# Patient Record
Sex: FEMALE | Race: BLACK OR AFRICAN AMERICAN | Employment: FULL TIME | URBAN - METROPOLITAN AREA
[De-identification: names, ages, dates, MRNs, and addresses within clinical notes are randomized per-mention and may not be internally consistent; named-entity substitution may affect disease eponyms.]

---

## 2017-02-02 ENCOUNTER — OFFICE VISIT (OUTPATIENT)
Dept: INTERNAL MEDICINE CLINIC | Age: 52
End: 2017-02-02

## 2017-02-02 VITALS
WEIGHT: 148.1 LBS | DIASTOLIC BLOOD PRESSURE: 84 MMHG | BODY MASS INDEX: 23.8 KG/M2 | TEMPERATURE: 99.2 F | SYSTOLIC BLOOD PRESSURE: 132 MMHG | HEART RATE: 98 BPM | HEIGHT: 66 IN | OXYGEN SATURATION: 99 % | RESPIRATION RATE: 20 BRPM

## 2017-02-02 DIAGNOSIS — G47.26 SHIFT WORK SLEEP DISORDER: ICD-10-CM

## 2017-02-02 DIAGNOSIS — G47.9 TROUBLE IN SLEEPING: Primary | ICD-10-CM

## 2017-02-02 DIAGNOSIS — E55.9 VITAMIN D DEFICIENCY: ICD-10-CM

## 2017-02-02 RX ORDER — ZOLPIDEM TARTRATE 5 MG/1
5 TABLET ORAL
Qty: 30 TAB | Refills: 2 | Status: SHIPPED | OUTPATIENT
Start: 2017-02-02

## 2017-02-02 RX ORDER — ERGOCALCIFEROL 1.25 MG/1
50000 CAPSULE ORAL
Qty: 14 CAP | Refills: 0 | Status: SHIPPED | OUTPATIENT
Start: 2017-02-02 | End: 2017-05-01 | Stop reason: SDUPTHER

## 2017-02-02 RX ORDER — ERGOCALCIFEROL 1.25 MG/1
50000 CAPSULE ORAL
COMMUNITY
End: 2017-02-02 | Stop reason: SDUPTHER

## 2017-02-02 NOTE — PATIENT INSTRUCTIONS
Short-acting Sleep agents:   Zolpidem (Ambien) 5 to 10 mg  Used for initiating sleep and sleep maintenance  Onset: 7 to 27 minutes  Half-life: 2 to 3 hours  Learning About Sleeping Well  What does sleeping well mean? Sleeping well means getting enough sleep. How much sleep is enough varies among people. The number of hours you sleep is not as important as how you feel when you wake up. If you do not feel refreshed, you probably need more sleep. Another sign of not getting enough sleep is feeling tired during the day. The average total nightly sleep time is 7½ to 8 hours. Healthy adults may need a little more or a little less than this. Why is getting enough sleep important? Getting enough quality sleep is a basic part of good health. When your sleep suffers, your mood and your thoughts can suffer too. You may find yourself feeling more grumpy or stressed. Not getting enough sleep also can lead to serious problems, including injury, accidents, anxiety, and depression. What might cause poor sleeping? Many things can cause sleep problems, including:  · Stress. Stress can be caused by fear about a single event, such as giving a speech. Or you may have ongoing stress, such as worry about work or school. · Depression, anxiety, and other mental or emotional conditions. · Changes in your sleep habits or surroundings. This includes changes that happen where you sleep, such as noise, light, or sleeping in a different bed. It also includes changes in your sleep pattern, such as having jet lag or working a late shift. · Health problems, such as pain, breathing problems, and restless legs syndrome. · Lack of regular exercise. How can you help yourself? Here are some tips that may help you sleep more soundly and wake up feeling more refreshed. Your sleeping area  · Use your bedroom only for sleeping and sex. A bit of light reading may help you fall asleep.  But if it doesn't, do your reading elsewhere in the house. Don't watch TV in bed. · Be sure your bed is big enough to stretch out comfortably, especially if you have a sleep partner. · Keep your bedroom quiet, dark, and cool. Use curtains, blinds, or a sleep mask to block out light. To block out noise, use earplugs, soothing music, or a \"white noise\" machine. Your evening and bedtime routine  · Create a relaxing bedtime routine. You might want to take a warm shower or bath, listen to soothing music, or drink a cup of noncaffeinated tea. · Go to bed at the same time every night. And get up at the same time every morning, even if you feel tired. What to avoid  · Limit caffeine (coffee, tea, caffeinated sodas) during the day, and don't have any for at least 4 to 6 hours before bedtime. · Don't drink alcohol before bedtime. Alcohol can cause you to wake up more often during the night. · Don't smoke or use tobacco, especially in the evening. Nicotine can keep you awake. · Don't take naps during the day, especially close to bedtime. · Don't lie in bed awake for too long. If you can't fall asleep, or if you wake up in the middle of the night and can't get back to sleep within 15 minutes or so, get out of bed and go to another room until you feel sleepy. · Don't take medicine right before bed that may keep you awake or make you feel hyper or energized. Your doctor can tell you if your medicine may do this and if you can take it earlier in the day. If you can't sleep  · Imagine yourself in a peaceful, pleasant scene. Focus on the details and feelings of being in a place that is relaxing. · Get up and do a quiet or boring activity until you feel sleepy. · Don't drink any liquids after 6 p.m. if you wake up often because you have to go to the bathroom. Where can you learn more? Go to http://zuri-juan pablo.info/. Enter M019 in the search box to learn more about \"Learning About Sleeping Well. \"  Current as of: July 26, 2016  Content Version: 11.1  © 7821-3191 Healthwise, Inc[employer]. Care instructions adapted under license by [employer] (which disclaims liability or warranty for this information). If you have questions about a medical condition or this instruction, always ask your healthcare professional. [address] any warranty or liability for your use of this information.

## 2017-02-02 NOTE — PROGRESS NOTES
Ms. Keven Comer is a 51y.o. year old female who had concerns including Insomnia. HPI:  Chief Complaint   Patient presents with    Insomnia     pt requesing rx for sleeping aid, on night shift, and shifts to day shift .     11;30 at nigt 7:30 and 7:30am-3 pm , alternating. Melatonin initially worked, then felt bad. Past Medical History   Diagnosis Date    Hx of colonoscopy with polypectomy Fall 2016     follow up in 3-5 years     Current Outpatient Prescriptions   Medication Sig Dispense    zolpidem (AMBIEN) 5 mg tablet Take 1 Tab by mouth nightly as needed for Sleep. Max Daily Amount: 5 mg. Indications: SLEEP-ONSET INSOMNIA 30 Tab    ergocalciferol (VITAMIN D2) 50,000 unit capsule Take 1 Cap by mouth every seven (7) days. 14 Cap    emtricitabine-tenofovir (TRUVADA) 200-300 mg per tablet Take  by mouth daily.  atazanavir (REYATAZ) 300 mg capsule Take 300 mg by mouth Daily (before breakfast).  ritonavir (NORVIR) 100 mg tablet Take  by mouth two (2) times daily (with meals). No current facility-administered medications for this visit. Reviewed PmHx, RxHx, FmHx, SocHx, AllgHx and updated and dated in the chart. ROS: Negative except for BOLD  General: fever, chills, fatigue  Respiratory: cough, SOB, wheezing  Cardiovascular:  CP, palpitation, CARVER, edema   Gastrointestinal: N/V/D, bleeding  Genito-Urinary: dysuria, hematuria  Musculoskeletal: muscle weakness, pain, swelling    OBJECTIVE:   Visit Vitals    /84 (BP 1 Location: Left arm, BP Patient Position: Sitting)    Pulse 98    Temp 99.2 °F (37.3 °C) (Oral)    Resp 20    Ht 5' 6\" (1.676 m)    Wt 148 lb 1.6 oz (67.2 kg)    SpO2 99%    BMI 23.9 kg/m2     GEN: The patient appears well, alert, oriented x 3, in no distress. ENT: bilateral TM and canal normal.  Neck supple. No adenopathy or thyromegaly. CUONG. Lungs: clear bilaterally, good air entry, no wheezes, rhonchi or rales.    Cardiovascular: regular rate and rhythm. S1 and S2 normal, no murmurs,  Abdomen: + BS, soft without tenderness, guarding, rebound, mass or organomegaly. Extremities: no edema, normal peripheral pulses. Neurological: normal, gross sensory and motor in tact without focal findings. Assessment/ Plan:       ICD-10-CM ICD-9-CM    1. Trouble in sleeping G47.9 780.50 zolpidem (AMBIEN) 5 mg tablet      SLEEP MEDICINE REFERRAL      ergocalciferol (VITAMIN D2) 50,000 unit capsule      DISCONTINUED: ergocalciferol (VITAMIN D2) 50,000 unit capsule      CANCELED: REFERRAL TO PSYCHOLOGY   2. Vitamin D deficiency E55.9 268.9 ergocalciferol (VITAMIN D2) 50,000 unit capsule     Sleep referral for sleep therapist.   No more than 3 months med treatment only      I have discussed the diagnosis with the patient and the intended plan as seen in the above orders. The patient has received an after-visit summary and questions were answered concerning future plans. Medication Side Effects and Warnings were discussed with patient.     Follow-up Disposition: Not on R Steve Vasquez MD

## 2017-10-30 ENCOUNTER — HOSPITAL ENCOUNTER (EMERGENCY)
Age: 52
Discharge: HOME | End: 2017-10-30
Attending: EMERGENCY MEDICINE
Payer: COMMERCIAL

## 2017-10-30 ENCOUNTER — APPOINTMENT (OUTPATIENT)
Dept: GENERAL RADIOLOGY | Age: 52
End: 2017-10-30
Attending: EMERGENCY MEDICINE
Payer: COMMERCIAL

## 2017-10-30 VITALS
HEART RATE: 80 BPM | TEMPERATURE: 98.4 F | DIASTOLIC BLOOD PRESSURE: 81 MMHG | BODY MASS INDEX: 23.95 KG/M2 | RESPIRATION RATE: 18 BRPM | SYSTOLIC BLOOD PRESSURE: 137 MMHG | OXYGEN SATURATION: 100 % | WEIGHT: 149.03 LBS | HEIGHT: 66 IN

## 2017-10-30 DIAGNOSIS — J06.9 ACUTE URI: Primary | ICD-10-CM

## 2017-10-30 LAB
ANION GAP SERPL CALC-SCNC: 4 MMOL/L (ref 5–15)
BASOPHILS # BLD: 0 K/UL (ref 0–0.1)
BASOPHILS NFR BLD: 0 % (ref 0–1)
BUN SERPL-MCNC: 16 MG/DL (ref 6–20)
BUN/CREAT SERPL: 26 (ref 12–20)
CALCIUM SERPL-MCNC: 8.5 MG/DL (ref 8.5–10.1)
CHLORIDE SERPL-SCNC: 108 MMOL/L (ref 97–108)
CO2 SERPL-SCNC: 28 MMOL/L (ref 21–32)
CREAT SERPL-MCNC: 0.61 MG/DL (ref 0.55–1.02)
EOSINOPHIL # BLD: 0.2 K/UL (ref 0–0.4)
EOSINOPHIL NFR BLD: 4 % (ref 0–7)
ERYTHROCYTE [DISTWIDTH] IN BLOOD BY AUTOMATED COUNT: 14.8 % (ref 11.5–14.5)
GLUCOSE SERPL-MCNC: 89 MG/DL (ref 65–100)
HCT VFR BLD AUTO: 42 % (ref 35–47)
HGB BLD-MCNC: 14.2 G/DL (ref 11.5–16)
LYMPHOCYTES # BLD: 1.6 K/UL (ref 0.8–3.5)
LYMPHOCYTES NFR BLD: 28 % (ref 12–49)
MCH RBC QN AUTO: 29.1 PG (ref 26–34)
MCHC RBC AUTO-ENTMCNC: 33.8 G/DL (ref 30–36.5)
MCV RBC AUTO: 86.1 FL (ref 80–99)
MONOCYTES # BLD: 0.6 K/UL (ref 0–1)
MONOCYTES NFR BLD: 10 % (ref 5–13)
NEUTS SEG # BLD: 3.3 K/UL (ref 1.8–8)
NEUTS SEG NFR BLD: 58 % (ref 32–75)
PLATELET # BLD AUTO: 177 K/UL (ref 150–400)
POTASSIUM SERPL-SCNC: 3.8 MMOL/L (ref 3.5–5.1)
RBC # BLD AUTO: 4.88 M/UL (ref 3.8–5.2)
SODIUM SERPL-SCNC: 140 MMOL/L (ref 136–145)
WBC # BLD AUTO: 5.6 K/UL (ref 3.6–11)

## 2017-10-30 PROCEDURE — 96374 THER/PROPH/DIAG INJ IV PUSH: CPT

## 2017-10-30 PROCEDURE — 85025 COMPLETE CBC W/AUTO DIFF WBC: CPT | Performed by: EMERGENCY MEDICINE

## 2017-10-30 PROCEDURE — 99282 EMERGENCY DEPT VISIT SF MDM: CPT

## 2017-10-30 PROCEDURE — 96361 HYDRATE IV INFUSION ADD-ON: CPT

## 2017-10-30 PROCEDURE — 74011250636 HC RX REV CODE- 250/636: Performed by: EMERGENCY MEDICINE

## 2017-10-30 PROCEDURE — 36415 COLL VENOUS BLD VENIPUNCTURE: CPT | Performed by: EMERGENCY MEDICINE

## 2017-10-30 PROCEDURE — 80048 BASIC METABOLIC PNL TOTAL CA: CPT | Performed by: EMERGENCY MEDICINE

## 2017-10-30 PROCEDURE — 71020 XR CHEST PA LAT: CPT

## 2017-10-30 RX ORDER — AZITHROMYCIN 250 MG/1
TABLET, FILM COATED ORAL
Qty: 6 TAB | Refills: 0 | Status: SHIPPED | OUTPATIENT
Start: 2017-10-30 | End: 2018-08-05

## 2017-10-30 RX ORDER — SODIUM CHLORIDE 0.9 % (FLUSH) 0.9 %
5-10 SYRINGE (ML) INJECTION AS NEEDED
Status: DISCONTINUED | OUTPATIENT
Start: 2017-10-30 | End: 2017-10-30 | Stop reason: HOSPADM

## 2017-10-30 RX ORDER — HYDROCODONE BITARTRATE AND HOMATROPINE METHYLBROMIDE ORAL SOLUTION 5; 1.5 MG/5ML; MG/5ML
5 LIQUID ORAL
Qty: 120 ML | Refills: 0 | Status: SHIPPED | OUTPATIENT
Start: 2017-10-30 | End: 2018-08-05

## 2017-10-30 RX ORDER — KETOROLAC TROMETHAMINE 30 MG/ML
30 INJECTION, SOLUTION INTRAMUSCULAR; INTRAVENOUS
Status: COMPLETED | OUTPATIENT
Start: 2017-10-30 | End: 2017-10-30

## 2017-10-30 RX ORDER — SODIUM CHLORIDE 0.9 % (FLUSH) 0.9 %
5-10 SYRINGE (ML) INJECTION EVERY 8 HOURS
Status: DISCONTINUED | OUTPATIENT
Start: 2017-10-30 | End: 2017-10-30 | Stop reason: HOSPADM

## 2017-10-30 RX ORDER — PREDNISONE 20 MG/1
40 TABLET ORAL DAILY
Qty: 10 TAB | Refills: 0 | Status: SHIPPED | OUTPATIENT
Start: 2017-10-30 | End: 2017-11-04

## 2017-10-30 RX ORDER — AZITHROMYCIN 250 MG/1
TABLET, FILM COATED ORAL
Qty: 4 TAB | Refills: 0 | Status: SHIPPED | OUTPATIENT
Start: 2017-10-30 | End: 2017-10-30

## 2017-10-30 RX ADMIN — KETOROLAC TROMETHAMINE 30 MG: 30 INJECTION, SOLUTION INTRAMUSCULAR at 07:38

## 2017-10-30 RX ADMIN — SODIUM CHLORIDE 1000 ML: 900 INJECTION, SOLUTION INTRAVENOUS at 07:38

## 2017-10-30 NOTE — DISCHARGE INSTRUCTIONS
Saline Nasal Washes: Care Instructions  Your Care Instructions  Saline nasal washes help keep the nasal passages open by washing out thick or dried mucus. This simple remedy can help relieve symptoms of allergies, sinusitis, and colds. It also can make the nose feel more comfortable by keeping the mucous membranes moist. You may notice a little burning sensation in your nose the first few times you use the solution, but this usually gets better in a few days. Follow-up care is a key part of your treatment and safety. Be sure to make and go to all appointments, and call your doctor if you are having problems. It's also a good idea to know your test results and keep a list of the medicines you take. How can you care for yourself at home? · You can buy premixed saline solution in a squeeze bottle or other sinus rinse products at a drugstore. Read and follow the instructions on the label. · You also can make your own saline solution by adding 1 teaspoon of salt and 1 teaspoon of baking soda to 2 cups of distilled water. · If you use a homemade solution, pour a small amount into a clean bowl. Using a rubber bulb syringe, squeeze the syringe and place the tip in the salt water. Pull a small amount of the salt water into the syringe by relaxing your hand. · Sit down with your head tilted slightly back. Do not lie down. Put the tip of the bulb syringe or the squeeze bottle a little way into one of your nostrils. Gently drip or squirt a few drops into the nostril. Repeat with the other nostril. Some sneezing and gagging are normal at first.  · Gently blow your nose. · Wipe the syringe or bottle tip clean after each use. · Repeat this 2 or 3 times a day. · Use nasal washes gently if you have nosebleeds often. When should you call for help? Watch closely for changes in your health, and be sure to contact your doctor if:  ? · You often get nosebleeds. ? · You have problems doing the nasal washes.    Where can you learn more? Go to http://zuri-juan pablo.info/. Enter 071 [id number] [id number] in the search box to learn more about \"Saline Nasal Washes: Care Instructions. \"  Current as of: May 12, 2017  Content Version: 11.4  © 6769-8723 [employer]. Care instructions adapted under license by [employer] (which disclaims liability or warranty for this information). If you have questions about a medical condition or this instruction, always ask your healthcare professional. [address] any warranty or liability for your use of this information. Upper Respiratory Infection (Cold): Care Instructions  Your Care Instructions    An upper respiratory infection, or URI, is an infection of the nose, sinuses, or throat. URIs are spread by coughs, sneezes, and direct contact. The common cold is the most frequent kind of URI. The flu and sinus infections are other kinds of URIs. Almost all URIs are caused by viruses. Antibiotics won't cure them. But you can treat most infections with home care. This may include drinking lots of fluids and taking over-the-counter pain medicine. You will probably feel better in 4 to 10 days. The doctor has checked you carefully, but problems can develop later. If you notice any problems or new symptoms, get medical treatment right away. Follow-up care is a key part of your treatment and safety. Be sure to make and go to all appointments, and call your doctor if you are having problems. It's also a good idea to know your test results and keep a list of the medicines you take. How can you care for yourself at home? · To prevent dehydration, drink plenty of fluids, enough so that your urine is light yellow or clear like water. Choose water and other caffeine-free clear liquids until you feel better. If you have kidney, heart, or liver disease and have to limit fluids, talk with your doctor before you increase the amount of fluids you drink.   · Take an over-the-counter pain medicine, such as acetaminophen (Tylenol), ibuprofen (Advil, Motrin), or naproxen (Aleve). Read and follow all instructions on the label. · Before you use cough and cold medicines, check the label. These medicines may not be safe for young children or for people with certain health problems. · Be careful when taking over-the-counter cold or flu medicines and Tylenol at the same time. Many of these medicines have acetaminophen, which is Tylenol. Read the labels to make sure that you are not taking more than the recommended dose. Too much acetaminophen (Tylenol) can be harmful. · Get plenty of rest.  · Do not smoke or allow others to smoke around you. If you need help quitting, talk to your doctor about stop-smoking programs and medicines. These can increase your chances of quitting for good. When should you call for help? Call 911 anytime you think you may need emergency care. For example, call if:  ? · You have severe trouble breathing. ?Call your doctor now or seek immediate medical care if:  ? · You seem to be getting much sicker. ? · You have new or worse trouble breathing. ? · You have a new or higher fever. ? · You have a new rash. ? Watch closely for changes in your health, and be sure to contact your doctor if:  ? · You have a new symptom, such as a sore throat, an earache, or sinus pain. ? · You cough more deeply or more often, especially if you notice more mucus or a change in the color of your mucus. ? · You do not get better as expected. Where can you learn more? Go to http://zuri-juan pablo.info/. Enter M174 in the search box to learn more about \"Upper Respiratory Infection (Cold): Care Instructions. \"  Current as of: May 12, 2017  Content Version: 11.4  © 9921-7857 Healthwise, [employer]. Care instructions adapted under license by [employer] (which disclaims liability or warranty for this information).  If you have questions about a medical condition or this instruction, always ask your healthcare professional. [place] [address] any warranty or liability for your use of this information.

## 2017-10-30 NOTE — LETTER
[address] 
[facility] EMERGENCY DEPT 
[address] [address] [phone] Work/School Note Date: 10/30/2017 To Whom It May concern: 
 
Josephine Smith was seen and treated today in the emergency room by the following provider(s): 
Attending Provider: Francia Juarez DO. Josephine Smith off work on night of 10/30/2017.  
 
 
Sincerely, 
 
 
 
 
Francia Juarez DO

## 2017-10-30 NOTE — ED PROVIDER NOTES
[facility]\"" [facility].  EMERGENCY DEPARTMENT HISTORY AND PHYSICAL EXAM       Date of Service: 10/30/2017   Patient Name: Jannette Sierra   YOB: 1965  Medical Record Number: [id number]    History of Presenting Illness     Chief Complaint   Patient presents with    Flu     onset wedensday with flu like symptoms         History Provided By:  patient    Additional History:   Jannette Sierra is a 45 y.o. female who presents ambulatory to the ED with cc of 8/10 generalized body aches, sore throat, and mildly productive cough x ~5 days. Pt states her symptoms have been waxing and waning since onset. She also c/o mild diarrhea since onset. Pt states she has been taking Mucinex without improvement. She denies sick contacts. Pt denies hx of DM or kidney disease. She denies allergies to any medications. Pt specifically denies nausea, vomiting, fever, and chills. She notes she will be driving herself home. Social Hx: + Tobacco, + EtOH, - Illicit Drugs    There are no other complaints, changes or physical findings at this time. Primary Care Provider: Alexis Prince MD     Past History     Past Medical History:   Past Medical History:   Diagnosis Date    Hx of colonoscopy with polypectomy Fall 2016    follow up in 3-5 years        Past Surgical History:   Past Surgical History:   Procedure Laterality Date    HX HYSTERECTOMY  2007    partial, ovaries intact        Family History:   Family History   Problem Relation Age of Onset    Cancer Mother      stomach    Cancer Father         Social History:   Social History   Substance Use Topics    Smoking status: Current Every Day Smoker     Packs/day: 1.00    Smokeless tobacco: Never Used    Alcohol use Yes      Comment: occasionally        Allergies: Allergies   Allergen Reactions    Percocet [Oxycodone-Acetaminophen] Itching         Review of Systems   Review of Systems   Constitutional: Negative.   Negative for appetite change, chills, fatigue and fever. HENT: Positive for sore throat. Negative for congestion, rhinorrhea and sinus pressure. Eyes: Negative. Respiratory: Positive for cough. Negative for choking, chest tightness, shortness of breath and wheezing. Cardiovascular: Negative. Negative for chest pain, palpitations and leg swelling. Gastrointestinal: Positive for diarrhea (mild). Negative for abdominal pain, constipation, nausea and vomiting. Endocrine: Negative. Genitourinary: Negative. Negative for difficulty urinating, dysuria, flank pain and urgency. Musculoskeletal: Positive for myalgias (generalized). Skin: Negative. Neurological: Negative. Negative for dizziness, speech difficulty, weakness, light-headedness, numbness and headaches. Psychiatric/Behavioral: Negative. All other systems reviewed and are negative. Physical Exam  Physical Exam   Constitutional: She is oriented to person, place, and time. She appears well-developed and well-nourished. No distress. HENT:   Head: Normocephalic and atraumatic. Mouth/Throat: Oropharynx is clear and moist. No oropharyngeal exudate. Eyes: Conjunctivae and EOM are normal. Pupils are equal, round, and reactive to light. Neck: Normal range of motion. Neck supple. No JVD present. No tracheal deviation present. Cardiovascular: Normal rate, regular rhythm, normal heart sounds and intact distal pulses. No murmur heard. Pulmonary/Chest: Effort normal and breath sounds normal. No stridor. No respiratory distress. She has no wheezes. She has no rales. She exhibits no tenderness. Abdominal: Soft. She exhibits no distension. There is no tenderness. There is no rebound and no guarding. Musculoskeletal: Normal range of motion. She exhibits no edema or tenderness. Neurological: She is alert and oriented to person, place, and time. No cranial nerve deficit. No gross motor or sensory deficits    Skin: Skin is warm and dry.  She is not diaphoretic. Psychiatric: She has a normal mood and affect. Her behavior is normal.   Nursing note and vitals reviewed. Medical Decision Making   I am the first provider for this patient. I reviewed the vital signs, available nursing notes, past medical history, past surgical history, family history and social history. Old Medical Records: Old medical records. Nursing notes. Provider Notes:     DDx: bronchitis, PNA, URI    ED Course:  6:44 AM  Initial assessment performed. The patients presenting problems have been discussed, and they are in agreement with the care plan formulated and outlined with them. I have encouraged them to ask questions as they arise throughout their visit. Diagnostic Study Results     Labs -      Recent Results (from the past 12 hour(s))   CBC WITH AUTOMATED DIFF    Collection Time: 10/30/17  7:36 AM   Result Value Ref Range    WBC 5.6 3.6 - 11.0 K/uL    RBC 4.88 3.80 - 5.20 M/uL    HGB 14.2 11.5 - 16.0 g/dL    HCT 42.0 35.0 - 47.0 %    MCV 86.1 80.0 - 99.0 FL    MCH 29.1 26.0 - 34.0 PG    MCHC 33.8 30.0 - 36.5 g/dL    RDW 14.8 (H) 11.5 - 14.5 %    PLATELET 663 483 - 840 K/uL    NEUTROPHILS 58 32 - 75 %    LYMPHOCYTES 28 12 - 49 %    MONOCYTES 10 5 - 13 %    EOSINOPHILS 4 0 - 7 %    BASOPHILS 0 0 - 1 %    ABS. NEUTROPHILS 3.3 1.8 - 8.0 K/UL    ABS. LYMPHOCYTES 1.6 0.8 - 3.5 K/UL    ABS. MONOCYTES 0.6 0.0 - 1.0 K/UL    ABS. EOSINOPHILS 0.2 0.0 - 0.4 K/UL    ABS.  BASOPHILS 0.0 0.0 - 0.1 K/UL   METABOLIC PANEL, BASIC    Collection Time: 10/30/17  7:36 AM   Result Value Ref Range    Sodium 140 136 - 145 mmol/L    Potassium 3.8 3.5 - 5.1 mmol/L    Chloride 108 97 - 108 mmol/L    CO2 28 21 - 32 mmol/L    Anion gap 4 (L) 5 - 15 mmol/L    Glucose 89 65 - 100 mg/dL    BUN 16 6 - 20 MG/DL    Creatinine 0.61 0.55 - 1.02 MG/DL    BUN/Creatinine ratio 26 (H) 12 - 20      GFR est AA >60 >60 ml/min/1.73m2    GFR est non-AA >60 >60 ml/min/1.73m2    Calcium 8.5 8.5 - 10.1 MG/DL Radiologic Studies -  The following have been ordered and reviewed:  CXR Results  (Last 48 hours)               10/30/17 0721  XR CHEST PA LAT Final result    Impression:  IMPRESSION:    Clear lungs. Narrative:  PA AND LATERAL CHEST RADIOGRAPHS: 10/30/2017 7:21 AM       INDICATION: Cough, chest wall pain. COMPARISON: 8/24/2015. TECHNIQUE: Frontal and lateral radiographs of the chest.       FINDINGS:   The lungs are clear. The central airways are patent. The heart size is normal.   No pneumothorax or pleural effusion. Vital Signs-Reviewed the patient's vital signs. Patient Vitals for the past 12 hrs:   Temp Pulse Resp BP SpO2   10/30/17 0645 98.4 °F (36.9 °C) 80 18 137/81 100 %       Medications Given in the ED:  Medications   sodium chloride (NS) flush 5-10 mL (not administered)   sodium chloride (NS) flush 5-10 mL (not administered)   sodium chloride 0.9 % bolus infusion 1,000 mL (1,000 mL IntraVENous New Bag 10/30/17 0738)   ketorolac (TORADOL) injection 30 mg (30 mg IntraVENous Given 10/30/17 0738)       Pulse Oximetry Analysis - Normal 100% on RA     Diagnosis   Clinical Impression:   1. Acute URI         Plan:  1:   Follow-up Information     Follow up With Details [id number] Suhail Millard MD   [address] [place] [address]  [phone]          2:   Current Discharge Medication List      START taking these medications    Details   HYDROcodone-homatropine (HYCODAN) 5-1.5 mg/5 mL syrup Take 5 mL by mouth four (4) times daily as needed. Max Daily Amount: 20 mL. Qty: 120 mL, Refills: 0      azithromycin (ZITHROMAX) 250 mg tablet Take 2 tabs on day 1, then 1 tab a day for 4 days  Qty: 4 Tab, Refills: 0      predniSONE (DELTASONE) 20 mg tablet Take 2 Tabs by mouth daily for 5 days. With Breakfast  Qty: 10 Tab, Refills: 0           Return to ED if Worse    Disposition Note:  DISCHARGE NOTE:  8:43 AM  The patient is ready for discharge.  The patients signs, symptoms, diagnosis, and instructions for discharge have been discussed and the pt has conveyed their understanding. The patient is to follow up as recommended or return to the ER should their symptoms worsen. Plan has been discussed and patient has conveyed their agreement.    _______________________________   Attestations: This note is prepared by Asha Hickman, acting as Scribe for Benjamin Silver, [address] [place] [address]: The scribe's documentation has been prepared under my direction and personally reviewed by me in its entirety.  I confirm that the note above accurately reflects all work, treatment, procedures, and medical decision making performed by me.  _______________________________

## 2017-10-30 NOTE — ED NOTES
Pt discharged by Dr. Alfreda Ponce. Patient provided with discharge instructions, Rx, and follow-up care instructions. Pt out of ED ambulatory with a steady gait.

## 2017-11-01 ENCOUNTER — OFFICE VISIT (OUTPATIENT)
Dept: INTERNAL MEDICINE CLINIC | Age: 52
End: 2017-11-01

## 2017-11-01 VITALS
TEMPERATURE: 97.9 F | HEART RATE: 70 BPM | SYSTOLIC BLOOD PRESSURE: 139 MMHG | RESPIRATION RATE: 18 BRPM | WEIGHT: 149.8 LBS | HEIGHT: 66 IN | DIASTOLIC BLOOD PRESSURE: 80 MMHG | OXYGEN SATURATION: 98 % | BODY MASS INDEX: 24.08 KG/M2

## 2017-11-01 DIAGNOSIS — Z21 HIV POSITIVE (HCC): ICD-10-CM

## 2017-11-01 DIAGNOSIS — R05.9 COUGH: Primary | ICD-10-CM

## 2017-11-01 DIAGNOSIS — F17.200 SMOKER: ICD-10-CM

## 2017-11-01 DIAGNOSIS — J40 BRONCHITIS: ICD-10-CM

## 2017-11-01 NOTE — LETTER
NOTIFICATION RETURN TO WORK / SCHOOL 
 
11/1/2017 10:46 AM 
 
Ms. Stef Jenkins [address] [phone] To Whom It May Concern: 
 
Stef Jenkins is currently under the care of Susana Naranjo. She will return to work/school on 11/6/17. Pt has been ill since October 30, 2017. If there are questions or concerns please have the patient contact our office.  
 
 
 
Sincerely, 
 
 
Yaniv Ku MD

## 2017-11-01 NOTE — PROGRESS NOTES
Ms. Stef Jenkins is a 51y.o. year old female who had concerns including Hospital Follow Up and Patient Education. HPI:  Chief Complaint   Patient presents with   [facility] Follow Up     Rm 5 patient states that she is still feeling pretty bad, cough still present, patient states that she was throwing up last night, ER diagnosed her with URI    Patient Education     patient going to make another apt for Pap     Vomiting after taking prednisone last night. Past Medical History:   Diagnosis Date    Hx of colonoscopy with polypectomy Fall 2016    follow up in 3-5 years     Current Outpatient Prescriptions   Medication Sig Dispense    HYDROcodone-homatropine (HYCODAN) 5-1.5 mg/5 mL syrup Take 5 mL by mouth four (4) times daily as needed. Max Daily Amount: 20 mL. 120 mL    predniSONE (DELTASONE) 20 mg tablet Take 2 Tabs by mouth daily for 5 days. With Breakfast 10 Tab    ergocalciferol (ERGOCALCIFEROL) 50,000 unit capsule TAKE 1 CAP BY MOUTH EVERY SEVEN (7) DAYS. 14 Cap    zolpidem (AMBIEN) 5 mg tablet Take 1 Tab by mouth nightly as needed for Sleep. Max Daily Amount: 5 mg. Indications: SLEEP-ONSET INSOMNIA 30 Tab    emtricitabine-tenofovir (TRUVADA) 200-300 mg per tablet Take  by mouth daily.  atazanavir (REYATAZ) 300 mg capsule Take 300 mg by mouth Daily (before breakfast).  ritonavir (NORVIR) 100 mg tablet Take  by mouth two (2) times daily (with meals).  azithromycin (ZITHROMAX) 250 mg tablet Take 2 tabs on day 1, then 1 tab a day for 4 days 6 Tab     No current facility-administered medications for this visit. Reviewed PmHx, RxHx, FmHx, SocHx, AllgHx and updated and dated in the chart.     ROS: Negative except for BOLD  General: fever, chills, fatigue  Respiratory: cough, SOB, wheezing  Cardiovascular:  CP, palpitation, CARVER, edema   Gastrointestinal: N/V/D, bleeding  Genito-Urinary: dysuria, hematuria  Musculoskeletal: muscle weakness, pain, swelling    OBJECTIVE:   Visit Vitals  /80 (BP 1 Location: Left arm, BP Patient Position: Sitting)    Pulse 70    Temp 97.9 °F (36.6 °C) (Oral)    Resp 18    Ht 5' 6\" (1.676 m)    Wt 149 lb 12.8 oz (67.9 kg)    SpO2 98%     L/min    BMI 24.18 kg/m2     GEN: The patient appears well, alert, oriented x 3, in no distress. ENT: bilateral TM and canal normal.  Neck supple. No adenopathy or thyromegaly. CUONG. Lungs: clear bilaterally, good air entry, no wheezes, rhonchi or rales. Bronchospastic cough with deep breaths. Cardiovascular: regular rate and rhythm. S1 and S2 normal, no murmurs,  Abdomen: + BS, soft without tenderness, guarding, rebound, mass or organomegaly. Extremities: no edema, normal peripheral pulses. Neurological: normal, gross sensory and motor in tact without focal findings. Results for orders placed or performed during the hospital encounter of 10/30/17   CBC WITH AUTOMATED DIFF   Result Value Ref Range    WBC 5.6 3.6 - 11.0 K/uL    RBC 4.88 3.80 - 5.20 M/uL    HGB 14.2 11.5 - 16.0 g/dL    HCT 42.0 35.0 - 47.0 %    MCV 86.1 80.0 - 99.0 FL    MCH 29.1 26.0 - 34.0 PG    MCHC 33.8 30.0 - 36.5 g/dL    RDW 14.8 (H) 11.5 - 14.5 %    PLATELET 452 345 - 917 K/uL    NEUTROPHILS 58 32 - 75 %    LYMPHOCYTES 28 12 - 49 %    MONOCYTES 10 5 - 13 %    EOSINOPHILS 4 0 - 7 %    BASOPHILS 0 0 - 1 %    ABS. NEUTROPHILS 3.3 1.8 - 8.0 K/UL    ABS. LYMPHOCYTES 1.6 0.8 - 3.5 K/UL    ABS. MONOCYTES 0.6 0.0 - 1.0 K/UL    ABS. EOSINOPHILS 0.2 0.0 - 0.4 K/UL    ABS.  BASOPHILS 0.0 0.0 - 0.1 K/UL   METABOLIC PANEL, BASIC   Result Value Ref Range    Sodium 140 136 - 145 mmol/L    Potassium 3.8 3.5 - 5.1 mmol/L    Chloride 108 97 - 108 mmol/L    CO2 28 21 - 32 mmol/L    Anion gap 4 (L) 5 - 15 mmol/L    Glucose 89 65 - 100 mg/dL    BUN 16 6 - 20 MG/DL    Creatinine 0.61 0.55 - 1.02 MG/DL    BUN/Creatinine ratio 26 (H) 12 - 20      GFR est AA >60 >60 ml/min/1.73m2    GFR est non-AA >60 >60 ml/min/1.73m2    Calcium 8.5 8.5 - 10.1 MG/DL Assessment/ Plan:       ICD-10-CM ICD-9-CM    1. Cough R05 786.2    2. Bronchitis J40 490    3. Smoker F17.200 305.1    4. [facility] positive ([facility] Z21 V08      Immune compromised pt. Continue prednisone for bronchospasm  Hold on azx,afebrile, sx < 3 days. I have discussed the diagnosis with the patient and the intended plan as seen in the above orders. The patient has received an after-visit summary and questions were answered concerning future plans. Medication Side Effects and Warnings were discussed with patient.     Follow-up Disposition: Not on R Steve Vasquez MD

## 2017-11-01 NOTE — MR AVS SNAPSHOT
Visit Information Date & Time Provider Department Dept. Phone Encounter #  
 11/1/2017  9:45 AM Sandeep Wan MD [facility] and [address] [place] [address] [phone] [id number] Upcoming Health Maintenance Date Due  
 PAP AKA CERVICAL CYTOLOGY 12/19/1988 INFLUENZA AGE 9 TO ADULT 12/30/2017* DTaP/Tdap/Td series (2 - Td) 11/1/2027 *Topic was postponed. The date shown is not the original due date. Allergies as of 11/1/2017  Review Complete On: 11/1/2017 By: Arabella Shoulder Severity Noted Reaction Type Reactions Percocet [Oxycodone-acetaminophen]  01/29/2016    Itching Current Immunizations  Never Reviewed No immunizations on file. Not reviewed this visit You Were Diagnosed With   
  
 Codes Comments Cough    -  Primary ICD-10-CM: O28 ICD-9-CM: 786.2 Bronchitis     ICD-10-CM: J40 ICD-9-CM: 375 Smoker     ICD-10-CM: L61.601 ICD-9-CM: 305.1 HIV positive ([facility]     ICD-10-CM: N60 ICD-9-CM: V08 Vitals BP Pulse Temp Resp Height(growth percentile) Weight(growth percentile) 139/80 (BP 1 Location: Left arm, BP Patient Position: Sitting) 70 97.9 °F (36.6 °C) (Oral) 18 5' 6\" (1.676 m) 149 lb 12.8 oz (67.9 kg) SpO2 PF BMI OB Status Smoking Status 98% 280 L/min 24.18 kg/m2 Hysterectomy Current Every Day Smoker Vitals History BMI and BSA Data Body Mass Index Body Surface Area  
 24.18 kg/m 2 1.78 m 2 Preferred Pharmacy Pharmacy Name Phone [id number]- [place], [place] - [address] [phone] Your Updated Medication List  
  
   
This list is accurate as of: 11/1/17 10:49 AM.  Always use your most recent med list.  
  
  
  
  
 azithromycin 250 mg tablet Commonly known as:  Arthann Kumari Take 2 tabs on day 1, then 1 tab a day for 4 days  
  
 ergocalciferol 50,000 unit capsule Commonly known as:  ERGOCALCIFEROL TAKE 1 CAP BY MOUTH EVERY SEVEN (7) DAYS. HYDROcodone-homatropine 5-1.5 mg/5 mL syrup Commonly known as:  HYCODAN Take 5 mL by mouth four (4) times daily as needed. Max Daily Amount: 20 mL. NORVIR 100 mg tablet Generic drug:  ritonavir Take  by mouth two (2) times daily (with meals). predniSONE 20 mg tablet Commonly known as:  [place] Take 2 Tabs by mouth daily for 5 days. With Breakfast  
  
 REYATAZ 300 mg capsule Generic drug:  atazanavir Take 300 mg by mouth Daily (before breakfast). TRUVADA 200-300 mg per tablet Generic drug:  emtricitabine-tenofovir (TDF) Take  by mouth daily. zolpidem 5 mg tablet Commonly known as:  AMBIEN Take 1 Tab by mouth nightly as needed for Sleep. Max Daily Amount: 5 mg. Indications: SLEEP-ONSET INSOMNIA Introducing [facility]! Perlita Valencia introduces [employer] patient portal. Now you can access parts of your medical record, email your doctor's office, and request medication refills online. 1. In your internet browser, go to [email or web address] 2. Click on the First Time User? Click Here link in the Sign In box. You will see the New Member Sign Up page. 3. Enter your [employer] Access Code exactly as it appears below. You will not need to use this code after youve completed the sign-up process. If you do not sign up before the expiration date, you must request a new code. · [employer] Access Code: [email or web address] Expires: 1/28/2018  8:43 AM 
 
4. Enter the last four digits of your Social Security Number (xxxx) and Date of Birth (mm/dd/yyyy) as indicated and click Submit. You will be taken to the next sign-up page. 5. Create a [employer]t ID. This will be your [employer] login ID and cannot be changed, so think of one that is secure and easy to remember. 6. Create a [employer]t password. You can change your password at any time. 7. Enter your Password Reset Question and Answer. This can be used at a later time if you forget your password. 8. Enter your e-mail address. You will receive e-mail notification when new information is available in [address] 9. Click Sign Up. You can now view and download portions of your medical record. 10. Click the Download Summary menu link to download a portable copy of your medical information. If you have questions, please visit the Frequently Asked Questions section of the [employer] website. Remember, [employer] is NOT to be used for urgent needs. For medical emergencies, dial 911. Now available from your iPhone and Android! Please provide this summary of care documentation to your next provider. Your primary care clinician is listed as Aurora Hooker. If you have any questions after today's visit, please call [phone].

## 2018-08-05 ENCOUNTER — HOSPITAL ENCOUNTER (EMERGENCY)
Age: 53
Discharge: HOME | End: 2018-08-05
Attending: EMERGENCY MEDICINE
Payer: SELF-PAY

## 2018-08-05 ENCOUNTER — APPOINTMENT (OUTPATIENT)
Dept: GENERAL RADIOLOGY | Age: 53
End: 2018-08-05
Attending: EMERGENCY MEDICINE
Payer: SELF-PAY

## 2018-08-05 VITALS
SYSTOLIC BLOOD PRESSURE: 161 MMHG | OXYGEN SATURATION: 99 % | HEIGHT: 66 IN | TEMPERATURE: 98.5 F | RESPIRATION RATE: 17 BRPM | DIASTOLIC BLOOD PRESSURE: 85 MMHG | BODY MASS INDEX: 26.22 KG/M2 | HEART RATE: 91 BPM | WEIGHT: 163.14 LBS

## 2018-08-05 DIAGNOSIS — M79.10 MYALGIA: Primary | ICD-10-CM

## 2018-08-05 DIAGNOSIS — R53.83 MALAISE AND FATIGUE: ICD-10-CM

## 2018-08-05 DIAGNOSIS — Z21 HIV POSITIVE (HCC): ICD-10-CM

## 2018-08-05 DIAGNOSIS — R53.81 MALAISE AND FATIGUE: ICD-10-CM

## 2018-08-05 DIAGNOSIS — R05.9 COUGH: ICD-10-CM

## 2018-08-05 LAB
ANION GAP SERPL CALC-SCNC: 6 MMOL/L (ref 5–15)
APPEARANCE UR: CLEAR
BACTERIA URNS QL MICRO: NEGATIVE /HPF
BASOPHILS # BLD: 0 K/UL (ref 0–0.1)
BASOPHILS NFR BLD: 1 % (ref 0–1)
BILIRUB UR QL: NEGATIVE
BUN SERPL-MCNC: 16 MG/DL (ref 6–20)
BUN/CREAT SERPL: 20 (ref 12–20)
CALCIUM SERPL-MCNC: 8.9 MG/DL (ref 8.5–10.1)
CHLORIDE SERPL-SCNC: 107 MMOL/L (ref 97–108)
CO2 SERPL-SCNC: 29 MMOL/L (ref 21–32)
COLOR UR: ABNORMAL
CREAT SERPL-MCNC: 0.8 MG/DL (ref 0.55–1.02)
DIFFERENTIAL METHOD BLD: ABNORMAL
EOSINOPHIL # BLD: 0.2 K/UL (ref 0–0.4)
EOSINOPHIL NFR BLD: 4 % (ref 0–7)
EPITH CASTS URNS QL MICRO: ABNORMAL /LPF
ERYTHROCYTE [DISTWIDTH] IN BLOOD BY AUTOMATED COUNT: 15.3 % (ref 11.5–14.5)
GLUCOSE SERPL-MCNC: 86 MG/DL (ref 65–100)
GLUCOSE UR STRIP.AUTO-MCNC: 250 MG/DL
HCT VFR BLD AUTO: 43.8 % (ref 35–47)
HGB BLD-MCNC: 14.3 G/DL (ref 11.5–16)
HGB UR QL STRIP: NEGATIVE
IMM GRANULOCYTES # BLD: 0 K/UL (ref 0–0.04)
IMM GRANULOCYTES NFR BLD AUTO: 0 % (ref 0–0.5)
KETONES UR QL STRIP.AUTO: NEGATIVE MG/DL
LEUKOCYTE ESTERASE UR QL STRIP.AUTO: NEGATIVE
LYMPHOCYTES # BLD: 2.1 K/UL (ref 0.8–3.5)
LYMPHOCYTES NFR BLD: 46 % (ref 12–49)
MCH RBC QN AUTO: 27.6 PG (ref 26–34)
MCHC RBC AUTO-ENTMCNC: 32.6 G/DL (ref 30–36.5)
MCV RBC AUTO: 84.4 FL (ref 80–99)
MONOCYTES # BLD: 0.5 K/UL (ref 0–1)
MONOCYTES NFR BLD: 12 % (ref 5–13)
NEUTS SEG # BLD: 1.7 K/UL (ref 1.8–8)
NEUTS SEG NFR BLD: 38 % (ref 32–75)
NITRITE UR QL STRIP.AUTO: NEGATIVE
NRBC # BLD: 0 K/UL (ref 0–0.01)
NRBC BLD-RTO: 0 PER 100 WBC
PH UR STRIP: 5.5 [PH] (ref 5–8)
PLATELET # BLD AUTO: 199 K/UL (ref 150–400)
PMV BLD AUTO: 11.1 FL (ref 8.9–12.9)
POTASSIUM SERPL-SCNC: 3.6 MMOL/L (ref 3.5–5.1)
PROT UR STRIP-MCNC: NEGATIVE MG/DL
RBC # BLD AUTO: 5.19 M/UL (ref 3.8–5.2)
RBC #/AREA URNS HPF: ABNORMAL /HPF (ref 0–5)
SODIUM SERPL-SCNC: 142 MMOL/L (ref 136–145)
SP GR UR REFRACTOMETRY: 1.02 (ref 1–1.03)
UA: UC IF INDICATED,UAUC: ABNORMAL
UROBILINOGEN UR QL STRIP.AUTO: 1 EU/DL (ref 0.2–1)
WBC # BLD AUTO: 4.6 K/UL (ref 3.6–11)
WBC URNS QL MICRO: ABNORMAL /HPF (ref 0–4)

## 2018-08-05 PROCEDURE — 81001 URINALYSIS AUTO W/SCOPE: CPT | Performed by: EMERGENCY MEDICINE

## 2018-08-05 PROCEDURE — 71046 X-RAY EXAM CHEST 2 VIEWS: CPT

## 2018-08-05 PROCEDURE — 36415 COLL VENOUS BLD VENIPUNCTURE: CPT | Performed by: PHYSICIAN ASSISTANT

## 2018-08-05 PROCEDURE — 99283 EMERGENCY DEPT VISIT LOW MDM: CPT

## 2018-08-05 PROCEDURE — 74011250637 HC RX REV CODE- 250/637: Performed by: PHYSICIAN ASSISTANT

## 2018-08-05 PROCEDURE — 80048 BASIC METABOLIC PNL TOTAL CA: CPT | Performed by: PHYSICIAN ASSISTANT

## 2018-08-05 PROCEDURE — 85025 COMPLETE CBC W/AUTO DIFF WBC: CPT | Performed by: PHYSICIAN ASSISTANT

## 2018-08-05 RX ORDER — LORATADINE 10 MG/1
10 TABLET ORAL
Status: COMPLETED | OUTPATIENT
Start: 2018-08-05 | End: 2018-08-05

## 2018-08-05 RX ORDER — IBUPROFEN 400 MG/1
400 TABLET ORAL
Status: COMPLETED | OUTPATIENT
Start: 2018-08-05 | End: 2018-08-05

## 2018-08-05 RX ORDER — IBUPROFEN 600 MG/1
600 TABLET ORAL
Qty: 20 TAB | Refills: 0 | Status: SHIPPED | OUTPATIENT
Start: 2018-08-05 | End: 2019-02-28 | Stop reason: ALTCHOICE

## 2018-08-05 RX ORDER — HYDROCODONE BITARTRATE AND HOMATROPINE METHYLBROMIDE ORAL SOLUTION 5; 1.5 MG/5ML; MG/5ML
5 LIQUID ORAL
Qty: 120 ML | Refills: 0 | Status: SHIPPED | OUTPATIENT
Start: 2018-08-05 | End: 2018-08-11

## 2018-08-05 RX ADMIN — IBUPROFEN 400 MG: 400 TABLET ORAL at 16:46

## 2018-08-05 RX ADMIN — LORATADINE 10 MG: 10 TABLET ORAL at 16:46

## 2018-08-05 NOTE — DISCHARGE INSTRUCTIONS
Cough: Care Instructions  Your Care Instructions    A cough is your body's response to something that bothers your throat or airways. Many things can cause a cough. You might cough because of a cold or the flu, bronchitis, or asthma. Smoking, postnasal drip, allergies, and stomach acid that backs up into your throat also can cause coughs. A cough is a symptom, not a disease. Most coughs stop when the cause, such as a cold, goes away. You can take a few steps at home to cough less and feel better. Follow-up care is a key part of your treatment and safety. Be sure to make and go to all appointments, and call your doctor if you are having problems. It's also a good idea to know your test results and keep a list of the medicines you take. How can you care for yourself at home? · Drink lots of water and other fluids. This helps thin the mucus and soothes a dry or sore throat. Honey or lemon juice in hot water or tea may ease a dry cough. · Take cough medicine as directed by your doctor. · Prop up your head on pillows to help you breathe and ease a dry cough. · Try cough drops to soothe a dry or sore throat. Cough drops don't stop a cough. Medicine-flavored cough drops are no better than candy-flavored drops or hard candy. · Do not smoke. Avoid secondhand smoke. If you need help quitting, talk to your doctor about stop-smoking programs and medicines. These can increase your chances of quitting for good. When should you call for help? Call 911 anytime you think you may need emergency care.  For example, call if:    · You have severe trouble breathing.    Call your doctor now or seek immediate medical care if:    · You cough up blood.     · You have new or worse trouble breathing.     · You have a new or higher fever.     · You have a new rash.    Watch closely for changes in your health, and be sure to contact your doctor if:    · You cough more deeply or more often, especially if you notice more mucus or a change in the color of your mucus.     · You have new symptoms, such as a sore throat, an earache, or sinus pain.     · You do not get better as expected. Where can you learn more? Go to http://zuri-juan pablo.info/. Enter D279 in the search box to learn more about \"Cough: Care Instructions. \"  Current as of: December 6, 2017  Content Version: 11.7  © 7335-0206 [employer]. Care instructions adapted under license by [employer] (which disclaims liability or warranty for this information). If you have questions about a medical condition or this instruction, always ask your healthcare professional. [address] any warranty or liability for your use of this information. Viral Infections: Care Instructions  Your Care Instructions    You don't feel well, but it's not clear what's causing it. You may have a viral infection. Viruses cause many illnesses, such as the common cold, influenza, fever, rashes, and the diarrhea, nausea, and vomiting that are often called \"stomach flu. \" You may wonder if antibiotic medicines could make you feel better. But antibiotics only treat infections caused by bacteria. They don't work on viruses. The good news is that viral infections usually aren't serious. Most will go away in a few days without medical treatment. In the meantime, there are a few things you can do to make yourself more comfortable. Follow-up care is a key part of your treatment and safety. Be sure to make and go to all appointments, and call your doctor if you are having problems. It's also a good idea to know your test results and keep a list of the medicines you take. How can you care for yourself at home? · Get plenty of rest if you feel tired. · Take an over-the-counter pain medicine if needed, such as acetaminophen (Tylenol), ibuprofen (Advil, Motrin), or naproxen (Aleve). Read and follow all instructions on the label.   · Be careful when taking over-the-counter cold or flu medicines and Tylenol at the same time. Many of these medicines have acetaminophen, which is Tylenol. Read the labels to make sure that you are not taking more than the recommended dose. Too much acetaminophen (Tylenol) can be harmful. · Drink plenty of fluids, enough so that your urine is light yellow or clear like water. If you have kidney, heart, or liver disease and have to limit fluids, talk with your doctor before you increase the amount of fluids you drink. · Stay home from work, school, and other public places while you have a fever. When should you call for help? Call 911 anytime you think you may need emergency care. For example, call if:    · You have severe trouble breathing.     · You passed out (lost consciousness).    Call your doctor now or seek immediate medical care if:    · You seem to be getting much sicker.     · You have a new or higher fever.     · You have blood in your stools.     · You have new belly pain, or your pain gets worse.     · You have a new rash.    Watch closely for changes in your health, and be sure to contact your doctor if:    · You start to get better and then get worse.     · You do not get better as expected. Where can you learn more? Go to http://zuri-juan pablo.info/. Enter C329 in the search box to learn more about \"Viral Infections: Care Instructions. \"  Current as of: November 18, 2017  Content Version: 11.7  © 0207-2337 [employer]. Care instructions adapted under license by [employer] (which disclaims liability or warranty for this information). If you have questions about a medical condition or this instruction, always ask your healthcare professional. [place] [address] any warranty or liability for your use of this information.

## 2018-08-05 NOTE — ED PROVIDER NOTES
EMERGENCY DEPARTMENT HISTORY AND PHYSICAL EXAM      Date: 8/5/2018  Patient Name: Junito Aguila    History of Presenting Illness     Chief Complaint   Patient presents with    Generalized Body Aches     Pt c/o cough, chest congestion and body ahces intermittently x 1 month. Pt states \"one day I feel fine, the next I feel bad\".  Cough    Chest Congestion       History Provided By: Patient    HPI: Junito Aguila, 47 y.o. female presents to the Emergency Dept with c/o generalized body aches, cough, malaise/fatigue x 1 month. Pt states she was initially seen at [facility] and placed on an inhaler, Doxycycline and prednisone. Pt reports \"I briefly started feeling a little better but now I feel like I have come down with something again\". Pt states the cough keeps her awake at night. No fever/chills. No N/V/D. Pt denied dysuria/hematuria. Pt is a smoker. Denied h/o chronic lung conditions. Has not taken any medication today before her arrival.  Upon chart review, it was noted she is HIV+. Pt states she receives care from an Internal Medicine physician at [facility] and has had her counts checked \"a few months ago\". Pt states \"my counts are almost undetectable\". Chief Complaint: malaise, fatigue, generalized body aches and cough  Duration: 1 Months  Timing:  Constant  Location: chest, body aches  Quality: Aching and Dull  Severity: 7 out of 10  Modifying Factors: no exacerbating/alleviating features  Associated Symptoms: denies any other associated signs or symptoms      There are no other complaints, changes, or physical findings at this time. PCP: Wade Mackenzie MD    Current Outpatient Prescriptions   Medication Sig Dispense Refill    HYDROcodone-homatropine (HYDROMET) 5-1.5 mg/5 mL syrup Take 5 mL by mouth four (4) times daily as needed for up to 6 days.  Max Daily Amount: 20 mL. 120 mL 0    ibuprofen (MOTRIN) 600 mg tablet Take 1 Tab by mouth every six (6) hours as needed for Pain for up to 20 doses. 20 Tab 0    ergocalciferol (ERGOCALCIFEROL) 50,000 unit capsule TAKE 1 CAP BY MOUTH EVERY SEVEN (7) DAYS. 14 Cap 0    zolpidem (AMBIEN) 5 mg tablet Take 1 Tab by mouth nightly as needed for Sleep. Max Daily Amount: 5 mg. Indications: SLEEP-ONSET INSOMNIA 30 Tab 2    emtricitabine-tenofovir (TRUVADA) 200-300 mg per tablet Take  by mouth daily.  atazanavir (REYATAZ) 300 mg capsule Take 300 mg by mouth Daily (before breakfast).  ritonavir (NORVIR) 100 mg tablet Take  by mouth two (2) times daily (with meals). Past History     Past Medical History:  Past Medical History:   Diagnosis Date    Hx of colonoscopy with polypectomy Fall 2016    follow up in 3-5 years       Past Surgical History:  Past Surgical History:   Procedure Laterality Date    HX HYSTERECTOMY  2007    partial, ovaries intact       Family History:  Family History   Problem Relation Age of Onset    Cancer Mother      stomach    Cancer Father        Social History:  Social History   Substance Use Topics    Smoking status: Current Every Day Smoker     Packs/day: 1.00    Smokeless tobacco: Never Used    Alcohol use Yes      Comment: occasionally       Allergies: Allergies   Allergen Reactions    Percocet [Oxycodone-Acetaminophen] Itching         Review of Systems   Review of Systems   Constitutional: Positive for fatigue. Negative for chills and fever. HENT: Negative for congestion, rhinorrhea and sore throat. Respiratory: Positive for cough. Negative for shortness of breath. Cardiovascular: Negative for chest pain and palpitations. Gastrointestinal: Negative for abdominal pain, diarrhea, nausea and vomiting. Endocrine: Negative for polydipsia, polyphagia and polyuria. Genitourinary: Negative for dysuria and hematuria. Musculoskeletal: Positive for myalgias. Negative for neck pain and neck stiffness. Skin: Negative for rash and wound.    Allergic/Immunologic: Negative for food allergies and immunocompromised state. Neurological: Negative for dizziness, weakness and headaches. Psychiatric/Behavioral: Negative for agitation and confusion. Physical Exam   Physical Exam   Constitutional: She is oriented to person, place, and time. She appears well-developed and well-nourished. No distress. WDWN AA female, alert, in NAD   HENT:   Head: Normocephalic and atraumatic. Nose: Nose normal.   Mouth/Throat: Oropharynx is clear and moist. No oropharyngeal exudate. Eyes: Conjunctivae and EOM are normal. Pupils are equal, round, and reactive to light. Right eye exhibits no discharge. Left eye exhibits no discharge. No scleral icterus. Neck: Normal range of motion. Neck supple. No JVD present. No tracheal deviation present. No thyromegaly present. No nuchal rigidity   Cardiovascular: Normal rate, regular rhythm and normal heart sounds. Pulmonary/Chest: Effort normal and breath sounds normal. No respiratory distress. She has no wheezes. Moderate cough noted, no appreciable wheezes   Abdominal: Soft. She exhibits no distension. There is no tenderness. There is no rebound and no guarding. Musculoskeletal: Normal range of motion. She exhibits no edema. Lymphadenopathy:     She has no cervical adenopathy. Neurological: She is alert and oriented to person, place, and time. She exhibits normal muscle tone. Coordination normal.   Skin: Skin is warm and dry. She is not diaphoretic. No pallor. Psychiatric: She has a normal mood and affect. Her behavior is normal. Judgment normal.   Nursing note and vitals reviewed.       Diagnostic Study Results     Labs -     Recent Results (from the past 12 hour(s))   URINALYSIS W/ REFLEX CULTURE    Collection Time: 08/05/18  2:26 PM   Result Value Ref Range    Color YELLOW/STRAW      Appearance CLEAR CLEAR      Specific gravity 1.024 1.003 - 1.030      pH (UA) 5.5 5.0 - 8.0      Protein NEGATIVE  NEG mg/dL    Glucose 250 (A) NEG mg/dL    Ketone NEGATIVE  NEG mg/dL    Bilirubin NEGATIVE  NEG      Blood NEGATIVE  NEG      Urobilinogen 1.0 0.2 - 1.0 EU/dL    Nitrites NEGATIVE  NEG      Leukocyte Esterase NEGATIVE  NEG      WBC 0-4 0 - 4 /hpf    RBC 0-5 0 - 5 /hpf    Epithelial cells FEW FEW /lpf    Bacteria NEGATIVE  NEG /hpf    UA:UC IF INDICATED CULTURE NOT INDICATED BY UA RESULT CNI     CBC WITH AUTOMATED DIFF    Collection Time: 08/05/18  4:51 PM   Result Value Ref Range    WBC 4.6 3.6 - 11.0 K/uL    RBC 5.19 3.80 - 5.20 M/uL    HGB 14.3 11.5 - 16.0 g/dL    HCT 43.8 35.0 - 47.0 %    MCV 84.4 80.0 - 99.0 FL    MCH 27.6 26.0 - 34.0 PG    MCHC 32.6 30.0 - 36.5 g/dL    RDW 15.3 (H) 11.5 - 14.5 %    PLATELET 905 282 - 746 K/uL    MPV 11.1 8.9 - 12.9 FL    NRBC 0.0 0  WBC    ABSOLUTE NRBC 0.00 0.00 - 0.01 K/uL    NEUTROPHILS 38 32 - 75 %    LYMPHOCYTES 46 12 - 49 %    MONOCYTES 12 5 - 13 %    EOSINOPHILS 4 0 - 7 %    BASOPHILS 1 0 - 1 %    IMMATURE GRANULOCYTES 0 0.0 - 0.5 %    ABS. NEUTROPHILS 1.7 (L) 1.8 - 8.0 K/UL    ABS. LYMPHOCYTES 2.1 0.8 - 3.5 K/UL    ABS. MONOCYTES 0.5 0.0 - 1.0 K/UL    ABS. EOSINOPHILS 0.2 0.0 - 0.4 K/UL    ABS. BASOPHILS 0.0 0.0 - 0.1 K/UL    ABS. IMM. GRANS. 0.0 0.00 - 0.04 K/UL    DF AUTOMATED     METABOLIC PANEL, BASIC    Collection Time: 08/05/18  4:51 PM   Result Value Ref Range    Sodium 142 136 - 145 mmol/L    Potassium 3.6 3.5 - 5.1 mmol/L    Chloride 107 97 - 108 mmol/L    CO2 29 21 - 32 mmol/L    Anion gap 6 5 - 15 mmol/L    Glucose 86 65 - 100 mg/dL    BUN 16 6 - 20 MG/DL    Creatinine 0.80 0.55 - 1.02 MG/DL    BUN/Creatinine ratio 20 12 - 20      GFR est AA >60 >60 ml/min/1.73m2    GFR est non-AA >60 >60 ml/min/1.73m2    Calcium 8.9 8.5 - 10.1 MG/DL       Radiologic Studies -   XR CHEST PA LAT   Final Result        CT Results  (Last 48 hours)    None        CXR Results  (Last 48 hours)               08/05/18 1607  XR CHEST PA LAT Final result    Impression:  IMPRESSION: No acute cardiopulmonary disease.            Narrative: Indication:  cough. Generalized body aches, congestion. Exam: PA and lateral views of the chest.       Direct comparison is made to prior CXR dated October 2017. Findings: Cardiomediastinal silhouette is within normal limits. Lungs are clear   bilaterally. Pleural spaces are normal. Osseous structures are intact. Medical Decision Making   I am the first provider for this patient. I reviewed the vital signs, available nursing notes, past medical history, past surgical history, family history and social history. Vital Signs-Reviewed the patient's vital signs. Patient Vitals for the past 12 hrs:   Temp Pulse Resp BP SpO2   08/05/18 1412 98.5 °F (36.9 °C) 91 17 161/85 99 %         Records Reviewed: Nursing Notes and Old Medical Records    Provider Notes (Medical Decision Making):   PNA, bronchitis/bronchospasm, URI/viral illness, electrolyte dysfunction, UTI    ED Course:   Initial assessment performed. The patients presenting problems have been discussed, and they are in agreement with the care plan formulated and outlined with them. I have encouraged them to ask questions as they arise throughout their visit. DISCHARGE NOTE:  6314  The care plan has been outline with the patient and/or family, who verbally conveyed understanding and agreement. Available results have been reviewed. Patient and/or family understand the follow up plan as outlined and discharge instructions. Should their condition deterioration at any time after discharge the patient agrees to return, follow up sooner than outlined or seek medical assistance at the closest Emergency Room as soon as possible. Questions have been answered. Discharge instructions and educational information regarding the patient's diagnosis as well a list of reasons why the patient would want to seek immediate medical attention, should their condition change, were reviewed directly with the patient/family     PLAN:  1.    Discharge Medication List as of 8/5/2018  5:29 PM      START taking these medications    Details   HYDROcodone-homatropine (HYDROMET) 5-1.5 mg/5 mL syrup Take 5 mL by mouth four (4) times daily as needed for up to 6 days. Max Daily Amount: 20 mL., Print, Disp-120 mL, R-0      ibuprofen (MOTRIN) 600 mg tablet Take 1 Tab by mouth every six (6) hours as needed for Pain for up to 20 doses. , Print, Disp-20 Tab, R-0         CONTINUE these medications which have NOT CHANGED    Details   ergocalciferol (ERGOCALCIFEROL) 50,000 unit capsule TAKE 1 CAP BY MOUTH EVERY SEVEN (7) DAYS., Normal, Disp-14 Cap, R-0      zolpidem (AMBIEN) 5 mg tablet Take 1 Tab by mouth nightly as needed for Sleep. Max Daily Amount: 5 mg. Indications: SLEEP-ONSET INSOMNIA, Print, Disp-30 Tab, R-2      emtricitabine-tenofovir (TRUVADA) 200-300 mg per tablet Take  by mouth daily. , Historical Med      atazanavir (REYATAZ) 300 mg capsule Take 300 mg by mouth Daily (before breakfast). , Historical Med      ritonavir (NORVIR) 100 mg tablet Take  by mouth two (2) times daily (with meals). , Historical Med           2. Follow-up Information     Follow up With Details Comments 1823 Reji Pulido MD       \[facility]\"" EMERGENCY DEPT  If symptoms worsen [address] [facility] [address]  [phone]        Return to ED if worse     Diagnosis     Clinical Impression:   1. Myalgia    2. Cough    3. Malaise and fatigue    4.  HIV positive ([facility] [address]

## 2018-08-05 NOTE — ED NOTES
[facility] at [facility] in the recent past for URI. Given steroid inhaler and doxycyline,. Not effective. Coughing up grayish phlegm. Lower back ache.  (+) smoker.

## 2018-08-05 NOTE — Clinical Note
Rest, push fluids, Motrin as needed for body aches. Follow up with your Internal Medicine physician if symptoms persist.  Return to the Emergency Dept for any worsening pain, fever, shortness of breath/cough, chest pain, decreased oral intake/urine outp ut.

## 2018-08-05 NOTE — ED NOTES
KATERIN Pavon reviewed discharge instructions with the patient. The patient verbalized understanding.

## 2018-08-05 NOTE — LETTER
[address] 
\[facility]\"" EMERGENCY DEPT 
[address] [place] [address] [address] [id number] [phone] Work/School Note Date: 8/5/2018 To Whom It May concern: 
 
Josephine Smith was seen and treated today in the emergency room. She may return to work in 1 to 2 days, as symptoms improve. Sincerely, Maycol Avalos

## 2019-02-28 ENCOUNTER — OFFICE VISIT (OUTPATIENT)
Dept: INTERNAL MEDICINE CLINIC | Age: 54
End: 2019-02-28

## 2019-02-28 VITALS
DIASTOLIC BLOOD PRESSURE: 79 MMHG | TEMPERATURE: 98.1 F | HEART RATE: 78 BPM | SYSTOLIC BLOOD PRESSURE: 120 MMHG | WEIGHT: 156.2 LBS | HEIGHT: 66 IN | RESPIRATION RATE: 16 BRPM | OXYGEN SATURATION: 99 % | BODY MASS INDEX: 25.1 KG/M2

## 2019-02-28 DIAGNOSIS — D17.0 LIPOMA OF FACE: ICD-10-CM

## 2019-02-28 DIAGNOSIS — Z12.39 SCREENING FOR BREAST CANCER: ICD-10-CM

## 2019-02-28 DIAGNOSIS — R23.2 HOT FLASHES: Primary | ICD-10-CM

## 2019-02-28 NOTE — PROGRESS NOTES
Chief Complaint Patient presents with  Menopause  
 
she is a 46y.o. year old female who presents for evaluation of hot flashes that have continuously gotten worse and last between 7-10 min each. Patient has a history of a partial hysterectomy still has her ovaries. Patient states that she had a family history of bad hot flashes around same age. She does not have an OB/GYN at this point. She has been offered SSRIs in the past and has not been taken these due to the fact that she is scared that she might get anxiety from these. Even after examination she is resistant to start this medication. Patient states that she is tried black cohosh in the past but not on a daily basis and is willing to start that again. Reviewed and agree with Nurse Note and duplicated in this note. Reviewed PmHx, RxHx, FmHx, SocHx, AllgHx and updated and dated in the chart. Family History Problem Relation Age of Onset  Cancer Mother   
     stomach  Cancer Father Past Medical History:  
Diagnosis Date  Hx of colonoscopy with polypectomy Fall 2016  
 follow up in 3-5 years Social History Socioeconomic History  Marital status: SINGLE Spouse name: Not on file  Number of children: Not on file  Years of education: Not on file  Highest education level: Not on file Tobacco Use  Smoking status: Current Every Day Smoker Packs/day: 1.00  Smokeless tobacco: Never Used Substance and Sexual Activity  Alcohol use: Yes Comment: occasionally  Drug use: No  
 Sexual activity: Yes  
  Partners: Male Review of Systems - negative except as listed above Objective:  
 
Vitals:  
 02/28/19 1342 BP: 120/79 Pulse: 78 Resp: 16 Temp: 98.1 °F (36.7 °C) TempSrc: Oral  
SpO2: 99% Weight: 156 lb 3.2 oz (70.9 kg) Height: 5' 6\" (1.676 m) Physical Examination: General appearance - alert, well appearing, and in no distress Eyes - pupils equal and reactive, extraocular eye movements intact Ears - bilateral TM's and external ear canals normal 
Nose - normal and patent, no erythema, discharge or polyps Mouth - mucous membranes moist, pharynx normal without lesions Neck - supple, no significant adenopathy Chest - clear to auscultation, no wheezes, rales or rhonchi, symmetric air entry Heart - normal rate, regular rhythm, normal S1, S2, no murmurs, rubs, clicks or gallops Abdomen - soft, nontender, nondistended, no masses or organomegaly Musculoskeletal - no joint tenderness, deformity or swelling Extremities - peripheral pulses normal, no pedal edema, no clubbing or cyanosis Skin - normal coloration and turgor, no rashes, no suspicious skin lesions noted Assessment/ Plan:  
Diagnoses and all orders for this visit: 1. Hot flashes 
-     REFERRAL TO OBSTETRICS AND GYNECOLOGY Patient is interested in hormone therapy 2. Lipoma of face 
-     REFERRAL TO DERMATOLOGY 3. Screening for breast cancer -     [facility] MAMMO BI SCREENING INCL CAD; Future 4. BMI 25.0-25.9,adult Follow-up Disposition: Not on File I have reviewed/discussed the above normal BMI with the patient. I have recommended the following interventions: dietary management education, guidance, and counseling . I have discussed the diagnosis with the patient and the intended plan as seen in the above orders. The patient has received an after-visit summary and questions were answered concerning future plans. Medication Side Effects and Warnings were discussed with patient, Patient Labs were reviewed and or requested, and 
Patient Past Records were reviewed and or requested  yes Pt agrees to call or return to clinic and/or go to closest ER with any worsening of symptoms. This may include, but not limited to increased fever (>100.4) with NSAIDS or Tylenol, increased edema, confusion, rash, worsening of presenting symptoms.

## 2019-03-20 ENCOUNTER — OFFICE VISIT (OUTPATIENT)
Dept: OBGYN CLINIC | Age: 54
End: 2019-03-20

## 2019-03-20 VITALS
BODY MASS INDEX: 24.91 KG/M2 | SYSTOLIC BLOOD PRESSURE: 120 MMHG | DIASTOLIC BLOOD PRESSURE: 70 MMHG | WEIGHT: 155 LBS | HEIGHT: 66 IN

## 2019-03-20 DIAGNOSIS — N95.1 HOT FLASHES DUE TO MENOPAUSE: Primary | ICD-10-CM

## 2019-03-20 NOTE — PROGRESS NOTES
Menopause Symptoms    Kassidy Huber is a 46 y.o. female whose last menses was No LMP recorded. Patient has had a hysterectomy. .  She presents for evaluation of menopausal symptoms. Her symptoms include: hot flashes x 1year. Additional symptoms include none. She describes her current menstrual bleeding as none, she is s/p hyst.  The patient is not currently taking hormone therapy.      Onset: 1 year  Location: none  Quality: none  Severity: severe      Past Medical History:   Diagnosis Date    HIV (human immunodeficiency virus infection) ([facility] [address]     Hx of colonoscopy with polypectomy Fall 2016    follow up in 3-5 years       Past Surgical History:   Procedure Laterality Date    HX HYSTERECTOMY  2007    partial, ovaries intact       Family History   Problem Relation Age of Onset    Cancer Mother         stomach    Ovarian Cancer Mother     Cancer Father     Prostate Cancer Father     Lung Cancer Sister        Social History     Socioeconomic History    Marital status: SINGLE     Spouse name: Not on file    Number of children: Not on file    Years of education: Not on file    Highest education level: Not on file   Occupational History    Not on file   Social Needs    Financial resource strain: Not on file    Food insecurity:     Worry: Not on file     Inability: Not on file    Transportation needs:     Medical: Not on file     Non-medical: Not on file   Tobacco Use    Smoking status: Current Every Day Smoker     Packs/day: 1.00    Smokeless tobacco: Never Used   Substance and Sexual Activity    Alcohol use: Yes     Comment: occasionally    Drug use: No    Sexual activity: Yes     Partners: Male     Birth control/protection: Surgical   Lifestyle    Physical activity:     Days per week: Not on file     Minutes per session: Not on file    Stress: Not on file   Relationships    Social connections:     Talks on phone: Not on file     Gets together: Not on file     Attends [employer] service: Not on file     Active member of club or organization: Not on file     Attends meetings of clubs or organizations: Not on file     Relationship status: Not on file    Intimate partner violence:     Fear of current or ex partner: Not on file     Emotionally abused: Not on file     Physically abused: Not on file     Forced sexual activity: Not on file   Other Topics Concern    Not on file   Social History Narrative    Not on file       Current Outpatient Medications   Medication Sig Dispense Refill    conjugated estrogens (PREMARIN) 0.3 mg tablet Take 1 Tab by mouth daily. 60 Tab 3    elvitegravir-cobicistat-emtricitabine-tenofovir alafenamide (GENVOYA) tab tablet Take 1 Tab by mouth daily (with breakfast).  zolpidem (AMBIEN) 5 mg tablet Take 1 Tab by mouth nightly as needed for Sleep. Max Daily Amount: 5 mg.  Indications: SLEEP-ONSET INSOMNIA 30 Tab 2       Allergies   Allergen Reactions    Percocet [Oxycodone-Acetaminophen] Itching       Review of Systems - History obtained from the patient  Constitutional: negative for weight loss, fever, night sweats  HEENT: negative for hearing loss, earache, congestion, snoring, sorethroat  CV: negative for chest pain, palpitations, edema  Resp: negative for cough, shortness of breath, wheezing  Breast: negative for breast lumps, nipple discharge, galactorrhea  GI: negative for change in bowel habits, abdominal pain, black or bloody stools  : negative for frequency, dysuria, hematuria, vaginal discharge  MSK: negative for back pain, joint pain, muscle pain  Skin: negative for itching, rash, hives  Neuro: negative for dizziness, headache, confusion, weakness  Psych: negative for anxiety, depression, change in mood  Heme/lymph: negative for bleeding, bruising, pallor      PHYSICAL EXAMINATION    Visit Vitals  /70 (BP 1 Location: Left arm, BP Patient Position: Sitting)   Ht 5' 6\" (1.676 m)   Wt 155 lb (70.3 kg)   BMI 25.02 kg/m² Constitutional  · Appearance: well-nourished, well developed, alert, in no acute distress    HENT  · Head and Face: appears normal    Neck  · Inspection/Palpation: normal appearance, no masses or tenderness  · Lymph Nodes: no lymphadenopathy present  · Thyroid: gland size normal, nontender, no nodules or masses present on palpation    Chest  · Respiratory Effort: non-labored breathing  · Auscultation: CTAB, normal breath sounds    Cardiovascular  · Heart:  · Auscultation: regular rate and rhythm without murmur  · Extremities: no peripheral edema    Gastrointestinal  · Abdominal Examination: abdomen non-tender to palpation, normal bowel sounds, no masses present  · Liver and spleen: no hepatomegaly present, spleen not palpable  · Hernias: no hernias identified    Skin  · General Inspection: no rash, no lesions identified    Neurologic/Psychiatric  · Mental Status:  · Orientation: grossly oriented to person, place and time  · Mood and Affect: mood normal, affect appropriate          Assessment/Plan:   46 y.o. presenting for evaluation and management of menopausal symptoms. Vasomotor symptoms:  -Reviewed options for management of VMS including HRT, paroxetine, gabapentin, clonidine, etc (risks/benefits/alternatives to each treatment discussed)  -reviewed pmh for contraindications to HRT (no h/o blood clots, CV disease, CVA, breast cancer)  -pt desires tx with oral estrogen today. Handouts provided.     RTO: for f/u in 3 months, or sooner prn for problems or concerns  -instructions and handouts given to pt    Mirella Kramer MD  3/20/2019  1:46 PM

## 2019-03-28 ENCOUNTER — HOSPITAL ENCOUNTER (OUTPATIENT)
Dept: MAMMOGRAPHY | Age: 54
Discharge: HOME | End: 2019-03-28
Attending: FAMILY MEDICINE
Payer: COMMERCIAL

## 2019-03-28 DIAGNOSIS — Z12.39 SCREENING FOR BREAST CANCER: ICD-10-CM

## 2019-03-28 PROCEDURE — 77067 SCR MAMMO BI INCL CAD: CPT

## 2019-05-21 ENCOUNTER — OFFICE VISIT (OUTPATIENT)
Dept: INTERNAL MEDICINE CLINIC | Age: 54
End: 2019-05-21

## 2019-05-21 VITALS
RESPIRATION RATE: 16 BRPM | SYSTOLIC BLOOD PRESSURE: 135 MMHG | HEIGHT: 66 IN | HEART RATE: 86 BPM | DIASTOLIC BLOOD PRESSURE: 84 MMHG | BODY MASS INDEX: 25.07 KG/M2 | TEMPERATURE: 98.5 F | OXYGEN SATURATION: 100 % | WEIGHT: 156 LBS

## 2019-05-21 DIAGNOSIS — J22 LOWER RESP. TRACT INFECTION: ICD-10-CM

## 2019-05-21 DIAGNOSIS — J06.9 UPPER RESPIRATORY TRACT INFECTION, UNSPECIFIED TYPE: ICD-10-CM

## 2019-05-21 DIAGNOSIS — Z72.0 TOBACCO ABUSE: ICD-10-CM

## 2019-05-21 DIAGNOSIS — Z71.6 TOBACCO ABUSE COUNSELING: ICD-10-CM

## 2019-05-21 DIAGNOSIS — M54.6 ACUTE LEFT-SIDED THORACIC BACK PAIN: Primary | ICD-10-CM

## 2019-05-21 RX ORDER — METHOCARBAMOL 750 MG/1
750 TABLET, FILM COATED ORAL 4 TIMES DAILY
Qty: 40 TAB | Refills: 0 | Status: SHIPPED | OUTPATIENT
Start: 2019-05-21 | End: 2020-06-21

## 2019-05-21 RX ORDER — ALBUTEROL SULFATE 90 UG/1
AEROSOL, METERED RESPIRATORY (INHALATION)
COMMUNITY
Start: 2019-05-18 | End: 2020-06-21

## 2019-05-21 RX ORDER — ERGOCALCIFEROL 1.25 MG/1
CAPSULE ORAL
COMMUNITY
Start: 2019-05-19

## 2019-05-21 RX ORDER — DOXYCYCLINE 100 MG/1
CAPSULE ORAL
COMMUNITY
Start: 2019-05-18 | End: 2020-06-21

## 2019-05-21 RX ORDER — IBUPROFEN 200 MG
1 TABLET ORAL EVERY 24 HOURS
Qty: 30 PATCH | Refills: 0 | Status: SHIPPED | OUTPATIENT
Start: 2019-05-21 | End: 2019-06-20

## 2019-05-21 NOTE — PROGRESS NOTES
SPORTS MEDICINE AND PRIMARY CARE  Viviana Payne MD  [address] [address]    Chief Complaint   Patient presents with   [facility] Follow Up     [facility], body ache, cough       SUBJECTIVE:    Aimee Clemente is a 46 y.o. female  with PMH HIV, menopausal flushing and tobacco dependency who presents for urgent care follow up after being evaluated and treated for respiratory illness. She feels better overall. She is taking doxycycline and using an albuterol inhaler. She admits improving productive cough, dyspnea, fever, sore throat and body aches. She had a negative chest x-ray and negative rapid strep test at [facility]. Had pneumococcal and influenza vaccines in the past. Currently, left upper back pain persists but is not aggravated by breathing. There is no chest pain, fever, chills or worsening shortness of breath. Appetite is good and taking in lots of fluids. Continues to smoke. Interested in a quit method. Used nicotine replacement therapy remotely. Current Outpatient Medications   Medication Sig Dispense Refill    PROAIR HFA 90 mcg/actuation inhaler       doxycycline (VIBRAMYCIN) 100 mg capsule       ergocalciferol (ERGOCALCIFEROL) 50,000 unit capsule       methocarbamol (ROBAXIN) 750 mg tablet Take 1 Tab by mouth four (4) times daily. 40 Tab 0    nicotine (NICODERM CQ) 21 mg/24 hr 1 Patch by TransDERmal route every twenty-four (24) hours for 30 days. 30 Patch 0    elvitegravir-cobicistat-emtricitabine-tenofovir alafenamide (GENVOYA) tab tablet Take 1 Tab by mouth daily (with breakfast).  zolpidem (AMBIEN) 5 mg tablet Take 1 Tab by mouth nightly as needed for Sleep. Max Daily Amount: 5 mg. Indications: SLEEP-ONSET INSOMNIA 30 Tab 2    conjugated estrogens (PREMARIN) 0.3 mg tablet Take 1 Tab by mouth daily.  61 Tab 3     Past Medical History:   Diagnosis Date    [facility]     Hx of colonoscopy with polypectomy Fall 2016    follow up in 3-5 years     Past Surgical History:   Procedure Laterality Date    HX HYSTERECTOMY  2007    partial, ovaries intact     Allergies   Allergen Reactions    Percocet [Oxycodone-Acetaminophen] Itching       REVIEW OF SYSTEMS:  General: negative for - chills or fever  ENT: negative for - headaches, nasal congestion or tinnitus  Respiratory: positive for dyspnea (improving) and cough; negative for hemoptysis, shortness of breath or wheezing  Cardiovascular : negative for - chest pain, edema,  Gastrointestinal: negative for - abdominal pain or nausea/vomiting  Musculoskeletal:+ upper back pain  Hematologic:  no swollen glands  Integument: no rash  Endocrine:no malaise/lethargy or unexpected weight changes      Social History     Socioeconomic History    Marital status: SINGLE     Spouse name: Not on file    Number of children: Not on file    Years of education: Not on file    Highest education level: Not on file   Tobacco Use    Smoking status: Current Every Day Smoker     Packs/day: 1.00    Smokeless tobacco: Never Used   Substance and Sexual Activity    Alcohol use: Yes     Comment: occasionally    Drug use: No    Sexual activity: Yes     Partners: Male     Birth control/protection: Surgical     Family History   Problem Relation Age of Onset    Cancer Mother         stomach    Ovarian Cancer Mother     Cancer Father     Prostate Cancer Father     Lung Cancer Sister        OBJECTIVE:     Visit Vitals  /84 (BP 1 Location: Left arm, BP Patient Position: Sitting)   Pulse 86   Temp 98.5 °F (36.9 °C) (Oral)   Resp 16   Ht 5' 6\" (1.676 m)   Wt 156 lb (70.8 kg)   SpO2 100%   BMI 25.18 kg/m²     CONSTITUTIONAL: well developed, well nourished, appears age appropriate  EYES: perrla, eom intact  ENMT:moist mucous membranes, pharynx clear, Tm unremarkable  NECK: supple.  Thyroid normal no jvd no bruit  RESPIRATORY: Chest: clear bilaterally  CARDIOVASCULAR: Heart: regular rate and rhythm  GASTROINTESTINAL: Abdomen: soft, bowel sounds active  HEMATOLOGIC: no pathological lymph nodes palpated  MUSCULOSKELETAL: Extremities: no edema,   INTEGUMENT: No unusual rashes or suspicious skin lesions noted. Nails appear normal.  NEUROLOGIC: non-focal exam   MENTAL STATUS: alert and oriented, appropriate affect     ASSESSMENT:   1. Acute upper and lower respiratory tract infection- improving   2. Acute left-sided thoracic back pain        3. Tobacco abuse with quit interest  4. HIV, stable, followed by [facility]    PLAN:  .  Orders Placed This Encounter    PROAIR HFA 90 mcg/actuation inhaler    doxycycline (VIBRAMYCIN) 100 mg capsule    ergocalciferol (ERGOCALCIFEROL) 50,000 unit capsule    methocarbamol (ROBAXIN) 750 mg tablet               nicotine (NICODERM CQ) 21 mg/24 hr , not to be used while smoking, tobacco quit date stressed   medication use and side effects discussed with patient and all questions were addressed   Heat to upper back, report persistent pain  Follow-up and Dispositions    · Return in about 3 months (around 8/21/2019)       I have discussed the diagnosis with the patient and the intended plan as seen in the  orders above. The patient understands and agees with the plan. The patient has   received an after visit summary and questions were answered concerning  future plans  Patient labs and/or xrays were reviewed  Past records were reviewed.

## 2019-05-21 NOTE — LETTER
NOTIFICATION RETURN TO WORK / SCHOOL 
 
5/21/2019 10:26 AM 
 
Ms. Brad Alcaraz [address] [place] [phone] To Whom It May Concern: 
 
Brad Alcaraz is currently under the care of Susana Naranjo. She will  Be out on 05/21 and 05/22 and will return to work/school on 05/23/2019. If there are questions or concerns please have the patient contact our office. Sincerely, Alessio Noguera MD

## 2019-05-21 NOTE — PATIENT INSTRUCTIONS
Stopping Smoking: Care Instructions  Your Care Instructions  Cigarette smokers crave the nicotine in cigarettes. Giving it up is much harder than simply changing a habit. Your body has to stop craving the nicotine. It is hard to quit, but you can do it. There are many tools that people use to quit smoking. You may find that combining tools works best for you. There are several steps to quitting. First you get ready to quit. Then you get support to help you. After that, you learn new skills and behaviors to become a nonsmoker. For many people, a necessary step is getting and using medicine. Your doctor will help you set up the plan that best meets your needs. You may want to attend a smoking cessation program to help you quit smoking. When you choose a program, look for one that has proven success. Ask your doctor for ideas. You will greatly increase your chances of success if you take medicine as well as get counseling or join a cessation program.  Some of the changes you feel when you first quit tobacco are uncomfortable. Your body will miss the nicotine at first, and you may feel short-tempered and grumpy. You may have trouble sleeping or concentrating. Medicine can help you deal with these symptoms. You may struggle with changing your smoking habits and rituals. The last step is the tricky one: Be prepared for the smoking urge to continue for a time. This is a lot to deal with, but keep at it. You will feel better. Follow-up care is a key part of your treatment and safety. Be sure to make and go to all appointments, and call your doctor if you are having problems. It's also a good idea to know your test results and keep a list of the medicines you take. How can you care for yourself at home? · Ask your family, friends, and coworkers for support. You have a better chance of quitting if you have help and support.   · Join a support group, such as Nicotine Anonymous, for people who are trying to quit smoking. · Consider signing up for a smoking cessation program, such as the American Lung Association's Freedom from Smoking program.  · Get text messaging support. Go to the website at www.smokefree. gov to sign up for the [facility] program.  · Set a quit date. Pick your date carefully so that it is not right in the middle of a big deadline or stressful time. Once you quit, do not even take a puff. Get rid of all ashtrays and lighters after your last cigarette. Clean your house and your clothes so that they do not smell of smoke. · Learn how to be a nonsmoker. Think about ways you can avoid those things that make you reach for a cigarette. ? Avoid situations that put you at greatest risk for smoking. For some people, it is hard to have a drink with friends without smoking. For others, they might skip a coffee break with coworkers who smoke. ? Change your daily routine. Take a different route to work or eat a meal in a different place. · Cut down on stress. Calm yourself or release tension by doing an activity you enjoy, such as reading a book, taking a hot bath, or gardening. · Talk to your doctor or pharmacist about nicotine replacement therapy, which replaces the nicotine in your body. You still get nicotine but you do not use tobacco. Nicotine replacement products help you slowly reduce the amount of nicotine you need. These products come in several forms, many of them available over-the-counter:  ? Nicotine patches  ? Nicotine gum and lozenges  ? Nicotine inhaler  · Ask your doctor about bupropion (Wellbutrin) or varenicline (Chantix), which are prescription medicines. They do not contain nicotine. They help you by reducing withdrawal symptoms, such as stress and anxiety. · Some people find hypnosis, acupuncture, and massage helpful for ending the smoking habit. · Eat a healthy diet and get regular exercise. Having healthy habits will help your body move past its craving for nicotine.   · Be prepared to keep trying. Most people are not successful the first few times they try to quit. Do not get mad at yourself if you smoke again. Make a list of things you learned and think about when you want to try again, such as next week, next month, or next year. Where can you learn more? Go to http://zuri-juan pablo.info/. Enter F107 in the search box to learn more about \"Stopping Smoking: Care Instructions. \"  Current as of: September 26, 2018  Content Version: 11.9  © 8881-8790 [employer]. Care instructions adapted under license by [employer] (which disclaims liability or warranty for this information). If you have questions about a medical condition or this instruction, always ask your healthcare professional. [address] any warranty or liability for your use of this information.

## 2019-05-21 NOTE — PROGRESS NOTES
Chief Complaint   Patient presents with   [facility] Follow Up     [facility], body ache, cough         1. Have you been to the ER, urgent care clinic since your last visit? Hospitalized since your last visit? [facility]  systoms. 2. Have you seen or consulted any other health care providers outside of the [address] [place] [address] since your last visit? Include any pap smears or colon screening.  no

## 2019-05-30 ENCOUNTER — OFFICE VISIT (OUTPATIENT)
Dept: INTERNAL MEDICINE CLINIC | Age: 54
End: 2019-05-30

## 2019-05-30 VITALS
RESPIRATION RATE: 18 BRPM | HEIGHT: 66 IN | DIASTOLIC BLOOD PRESSURE: 80 MMHG | WEIGHT: 155.5 LBS | OXYGEN SATURATION: 100 % | SYSTOLIC BLOOD PRESSURE: 128 MMHG | BODY MASS INDEX: 24.99 KG/M2 | HEART RATE: 73 BPM | TEMPERATURE: 99.1 F

## 2019-05-30 DIAGNOSIS — J40 BRONCHITIS: Primary | ICD-10-CM

## 2019-05-30 DIAGNOSIS — J02.9 SORE THROAT: ICD-10-CM

## 2019-05-30 LAB
S PYO AG THROAT QL: NEGATIVE
VALID INTERNAL CONTROL?: YES

## 2019-05-30 RX ORDER — METHYLPREDNISOLONE 4 MG/1
TABLET ORAL
Qty: 1 DOSE PACK | Refills: 0 | Status: SHIPPED | OUTPATIENT
Start: 2019-05-30 | End: 2020-06-21

## 2019-05-30 RX ORDER — AZITHROMYCIN 250 MG/1
TABLET, FILM COATED ORAL
Qty: 6 TAB | Refills: 0 | Status: SHIPPED | OUTPATIENT
Start: 2019-05-30 | End: 2019-06-04

## 2019-05-30 NOTE — PATIENT INSTRUCTIONS
Viral Respiratory Infection: Care Instructions Your Care Instructions Viruses are very small organisms. They grow in number after they enter your body. There are many types that cause different illnesses, such as colds and the mumps. The symptoms of a viral respiratory infection often start quickly. They include a fever, sore throat, and runny nose. You may also just not feel well. Or you may not want to eat much. Most viral respiratory infections are not serious. They usually get better with time and self-care. Antibiotics are not used to treat a viral infection. That's because antibiotics will not help cure a viral illness. In some cases, antiviral medicine can help your body fight a serious viral infection. Follow-up care is a key part of your treatment and safety. Be sure to make and go to all appointments, and call your doctor if you are having problems. It's also a good idea to know your test results and keep a list of the medicines you take. How can you care for yourself at home? · Rest as much as possible until you feel better. · Be safe with medicines. Take your medicine exactly as prescribed. Call your doctor if you think you are having a problem with your medicine. You will get more details on the specific medicine your doctor prescribes. · Take an over-the-counter pain medicine, such as acetaminophen (Tylenol), ibuprofen (Advil, Motrin), or naproxen (Aleve), as needed for pain and fever. Read and follow all instructions on the label. Do not give aspirin to anyone younger than 20. It has been linked to Reye syndrome, a serious illness. · Drink plenty of fluids, enough so that your urine is light yellow or clear like water. Hot fluids, such as tea or soup, may help relieve congestion in your nose and throat. If you have kidney, heart, or liver disease and have to limit fluids, talk with your doctor before you increase the amount of fluids you drink. · Try to clear mucus from your lungs by breathing deeply and coughing. · Gargle with warm salt water once an hour. This can help reduce swelling and throat pain. Use 1 teaspoon of salt mixed in 1 cup of warm water. · Do not smoke or allow others to smoke around you. If you need help quitting, talk to your doctor about stop-smoking programs and medicines. These can increase your chances of quitting for good. To avoid spreading the virus · Cough or sneeze into a tissue. Then throw the tissue away. · If you don't have a tissue, use your hand to cover your cough or sneeze. Then clean your hand. You can also cough into your sleeve. · Wash your hands often. Use soap and warm water. Wash for 15 to 20 seconds each time. · If you don't have soap and water near you, you can clean your hands with alcohol wipes or gel. When should you call for help? Call your doctor now or seek immediate medical care if: 
  · You have a new or higher fever.  
  · Your fever lasts more than 48 hours.  
  · You have trouble breathing.  
  · You have a fever with a stiff neck or a severe headache.  
  · You are sensitive to light.  
  · You feel very sleepy or confused.  
 Watch closely for changes in your health, and be sure to contact your doctor if: 
  · You do not get better as expected. Where can you learn more? Go to http://zuri-juan pablo.info/. Enter E908 in the search box to learn more about \"Viral Respiratory Infection: Care Instructions. \" Current as of: September 5, 2018 Content Version: 11.9 © 4082-2419 [employer]. Care instructions adapted under license by [employer] (which disclaims liability or warranty for this information). If you have questions about a medical condition or this instruction, always ask your healthcare professional. [address] any warranty or liability for your use of this information.

## 2019-05-30 NOTE — PROGRESS NOTES
Chief Complaint   Patient presents with    Cold Symptoms     she is a 46y.o. year old female who presents for evaluation of cold symptoms  sore throat, productive cough and both ear pain for 2 weeks. NOlow grade fevers. no nausea and no vomiting . No coryza and night sweats. Over-the-counter remedies including mucinex   has been used with good relief of symptoms. Hx Asthma:  no  Smoker:  yes  Contacts with similar infections: no   Recent travel:yes   Sputum Description: green      Reviewed and agree with Nurse Note and duplicated in this note. Reviewed PmHx, RxHx, FmHx, SocHx, AllgHx and updated and dated in the chart.     Family History   Problem Relation Age of Onset    Cancer Mother         stomach    Ovarian Cancer Mother     Cancer Father     Prostate Cancer Father     Lung Cancer Sister        Past Medical History:   Diagnosis Date    [facility]human immunodeficiency virus infection) ([facility]     Hx of colonoscopy with polypectomy Fall 2016    follow up in 3-5 years      Social History     Socioeconomic History    Marital status: SINGLE     Spouse name: Not on file    Number of children: Not on file    Years of education: Not on file    Highest education level: Not on file   Tobacco Use    Smoking status: Current Every Day Smoker     Packs/day: 1.00    Smokeless tobacco: Never Used   Substance and Sexual Activity    Alcohol use: Yes     Comment: occasionally    Drug use: No    Sexual activity: Yes     Partners: Male     Birth control/protection: Surgical        Review of Systems - negative except as listed above      Objective:     Vitals:    05/30/19 0940   BP: 128/80   Pulse: 73   Resp: 18   Temp: 99.1 °F (37.3 °C)   TempSrc: Oral   SpO2: 100%   Weight: 155 lb 8 oz (70.5 kg)   Height: 5' 6\" (1.676 m)       Physical Examination: General appearance - alert, well appearing, and in no distress  Eyes - pupils equal and reactive, extraocular eye movements intact  Ears - bilateral TM's and external ear canals normal  Nose - normal and patent, no erythema, discharge or polyps  Mouth - mucous membranes moist, pharynx normal without lesions  Neck - supple, no significant adenopathy  Chest - clear to auscultation, no wheezes, rales or rhonchi, symmetric air entry, wheezing noted all lung fields  Heart - normal rate, regular rhythm, normal S1, S2, no murmurs, rubs, clicks or gallops  Abdomen - soft, nontender, nondistended, no masses or organomegaly  Extremities - peripheral pulses normal, no pedal edema, no clubbing or cyanosis  Skin - normal coloration and turgor, no rashes, no suspicious skin lesions noted    Assessment/ Plan:   Diagnoses and all orders for this visit:    1. Bronchitis    2. Sore throat  -     AMB POC RAPID STREP A    Other orders  -     methylPREDNISolone (MEDROL DOSEPACK) 4 mg tablet; Take as directed  -     azithromycin (ZITHROMAX) 250 mg tablet; Take 2 tablets today, then take 1 tablet daily                 Adults: For nasal congestion, cough and cold/flu symptoms I advised:    - Seek medical care if symptoms become more severe or if you develop      chest pain, shortness of breath, confusion.    - Contact us if your symptoms fail to improve after 7-10 days   - Rest as much as possible and stay home from work/school at least 24 hours                  after last fever              - Wash hand frequently and cough/sneeze into your sleeve to help prevent       infection of others   - Drink plenty of fluids   - Ibuprofen (Advil, Motrin) 400-800mg every 6 hours or                  Aleve 220 mg 1-2 pills every 8 hours for fever, headache, pain   - Tylenol extra strength 500 mg every 6 hours for pain, headache, fever   - Nasal saline rinses 2-3 times daily for nasal congestion   - Mucinex 1200 mg twice daily or Guaifenesin 400 mg every 4 hours for chest       congestion              - Robitussin DM or Delsym for cough(suppress cough and thin mucus.  )   - Cepacol throat lozenges and saline gargles (1 tsp salt in 8 oz water) for sore       throat   - Tea with honey for cough (buckwheat honey preferred)              - Benadryl (diphenhydramine) 50 mg at night for nasal congestion/allergies   - Pseudoephedrine 12-hour tablets twice daily for nasal and inner ear       -Ask your pharmacist (this is kept behind the counter)     -If you have high blood pressure or heart disease, use this        medication with caution (ask your doctor), alternative coricidin   - Afrin (oxymetazoline) nasal spray 2 sprays in each nostril twice daily for severe      congestion.       -Do not use this medication for more than 3 days as it may cause         \"rebound congestion\". -If you have high blood pressure or heart disease, use this medication       with caution (ask your doctor)      Children:   Sit in bathroom with hot shower running for steam.    Nasal saline rinses and Neti pot  In general for viral respiratory infection -  Aim for drainage/increased airflow  Increase fluids - Pedialyte popsicles, Gatorade mixed with 50% water      1) Remember to stay active and/or exercise regularly (I suggest 30-45 minutes daily)   2) For reliable dietary information, go to www. EATRIGHT.org. You may wish to consider seeing the nutritionist at [facility] [phone], also consider the [address] I have discussed the diagnosis with the patient and the intended plan as seen in the above orders. The patient has received an after-visit summary and questions were answered concerning future plans. Medication Side Effects and Warnings were discussed with patient: yes  Patient Labs were reviewed and or requested: yes  Patient Past Records were reviewed and or requested  yes  I have discussed the diagnosis with the patient and the intended plan as seen in the above orders. The patient has received an after-visit summary and questions were answered concerning future plans.      Pt agrees to call or return to clinic and/or go to closest ER with any worsening of symptoms. This may include, but not limited to increased fever (>100.4) with NSAIDS or Tylenol, increased edema, confusion, rash, worsening of presenting symptoms.

## 2020-06-21 ENCOUNTER — HOSPITAL ENCOUNTER (EMERGENCY)
Age: 55
Discharge: HOME | End: 2020-06-21
Attending: EMERGENCY MEDICINE
Payer: COMMERCIAL

## 2020-06-21 VITALS
HEART RATE: 96 BPM | SYSTOLIC BLOOD PRESSURE: 133 MMHG | TEMPERATURE: 99.2 F | BODY MASS INDEX: 23.74 KG/M2 | DIASTOLIC BLOOD PRESSURE: 61 MMHG | HEIGHT: 66 IN | RESPIRATION RATE: 18 BRPM | WEIGHT: 147.71 LBS | OXYGEN SATURATION: 99 %

## 2020-06-21 DIAGNOSIS — M54.9 UPPER BACK PAIN: ICD-10-CM

## 2020-06-21 DIAGNOSIS — R06.02 SOB (SHORTNESS OF BREATH): ICD-10-CM

## 2020-06-21 DIAGNOSIS — Z72.0 TOBACCO ABUSE: ICD-10-CM

## 2020-06-21 DIAGNOSIS — S39.012A BACK STRAIN, INITIAL ENCOUNTER: Primary | ICD-10-CM

## 2020-06-21 LAB
ALBUMIN SERPL-MCNC: 3.6 G/DL (ref 3.5–5)
ALBUMIN/GLOB SERPL: 1 {RATIO} (ref 1.1–2.2)
ALP SERPL-CCNC: 57 U/L (ref 45–117)
ALT SERPL-CCNC: 20 U/L (ref 12–78)
ANION GAP SERPL CALC-SCNC: 6 MMOL/L (ref 5–15)
AST SERPL-CCNC: 24 U/L (ref 15–37)
BASOPHILS # BLD: 0 K/UL (ref 0–0.1)
BASOPHILS NFR BLD: 1 % (ref 0–1)
BILIRUB SERPL-MCNC: 0.2 MG/DL (ref 0.2–1)
BUN SERPL-MCNC: 17 MG/DL (ref 6–20)
BUN/CREAT SERPL: 25 (ref 12–20)
CALCIUM SERPL-MCNC: 8.5 MG/DL (ref 8.5–10.1)
CHLORIDE SERPL-SCNC: 110 MMOL/L (ref 97–108)
CK SERPL-CCNC: 91 U/L (ref 26–192)
CO2 SERPL-SCNC: 25 MMOL/L (ref 21–32)
CREAT SERPL-MCNC: 0.69 MG/DL (ref 0.55–1.02)
D DIMER PPP FEU-MCNC: <0.19 MG/L FEU (ref 0–0.65)
DIFFERENTIAL METHOD BLD: ABNORMAL
EOSINOPHIL # BLD: 0.1 K/UL (ref 0–0.4)
EOSINOPHIL NFR BLD: 2 % (ref 0–7)
ERYTHROCYTE [DISTWIDTH] IN BLOOD BY AUTOMATED COUNT: 15.2 % (ref 11.5–14.5)
GLOBULIN SER CALC-MCNC: 3.7 G/DL (ref 2–4)
GLUCOSE SERPL-MCNC: 78 MG/DL (ref 65–100)
HCT VFR BLD AUTO: 40.7 % (ref 35–47)
HGB BLD-MCNC: 13.3 G/DL (ref 11.5–16)
IMM GRANULOCYTES # BLD AUTO: 0 K/UL (ref 0–0.04)
IMM GRANULOCYTES NFR BLD AUTO: 0 % (ref 0–0.5)
LIPASE SERPL-CCNC: 93 U/L (ref 73–393)
LYMPHOCYTES # BLD: 2 K/UL (ref 0.8–3.5)
LYMPHOCYTES NFR BLD: 46 % (ref 12–49)
MCH RBC QN AUTO: 28.1 PG (ref 26–34)
MCHC RBC AUTO-ENTMCNC: 32.7 G/DL (ref 30–36.5)
MCV RBC AUTO: 85.9 FL (ref 80–99)
MONOCYTES # BLD: 0.5 K/UL (ref 0–1)
MONOCYTES NFR BLD: 11 % (ref 5–13)
NEUTS SEG # BLD: 1.7 K/UL (ref 1.8–8)
NEUTS SEG NFR BLD: 40 % (ref 32–75)
NRBC # BLD: 0 K/UL (ref 0–0.01)
NRBC BLD-RTO: 0 PER 100 WBC
PLATELET # BLD AUTO: 210 K/UL (ref 150–400)
PMV BLD AUTO: 11.7 FL (ref 8.9–12.9)
POTASSIUM SERPL-SCNC: 3.8 MMOL/L (ref 3.5–5.1)
PROT SERPL-MCNC: 7.3 G/DL (ref 6.4–8.2)
RBC # BLD AUTO: 4.74 M/UL (ref 3.8–5.2)
SODIUM SERPL-SCNC: 141 MMOL/L (ref 136–145)
TROPONIN I SERPL-MCNC: <0.05 NG/ML
WBC # BLD AUTO: 4.2 K/UL (ref 3.6–11)

## 2020-06-21 PROCEDURE — 82550 ASSAY OF CK (CPK): CPT

## 2020-06-21 PROCEDURE — 80053 COMPREHEN METABOLIC PANEL: CPT

## 2020-06-21 PROCEDURE — 85025 COMPLETE CBC W/AUTO DIFF WBC: CPT

## 2020-06-21 PROCEDURE — 99284 EMERGENCY DEPT VISIT MOD MDM: CPT

## 2020-06-21 PROCEDURE — 83690 ASSAY OF LIPASE: CPT

## 2020-06-21 PROCEDURE — 96374 THER/PROPH/DIAG INJ IV PUSH: CPT

## 2020-06-21 PROCEDURE — 84484 ASSAY OF TROPONIN QUANT: CPT

## 2020-06-21 PROCEDURE — 36415 COLL VENOUS BLD VENIPUNCTURE: CPT

## 2020-06-21 PROCEDURE — 85379 FIBRIN DEGRADATION QUANT: CPT

## 2020-06-21 PROCEDURE — 74011250636 HC RX REV CODE- 250/636: Performed by: PHYSICIAN ASSISTANT

## 2020-06-21 PROCEDURE — 93005 ELECTROCARDIOGRAM TRACING: CPT

## 2020-06-21 PROCEDURE — 74011250637 HC RX REV CODE- 250/637: Performed by: PHYSICIAN ASSISTANT

## 2020-06-21 RX ORDER — TRAMADOL HYDROCHLORIDE 50 MG/1
50 TABLET ORAL
Qty: 20 TAB | Refills: 0 | Status: SHIPPED | OUTPATIENT
Start: 2020-06-21 | End: 2020-06-24

## 2020-06-21 RX ORDER — METHOCARBAMOL 750 MG/1
750 TABLET, FILM COATED ORAL
Status: COMPLETED | OUTPATIENT
Start: 2020-06-21 | End: 2020-06-21

## 2020-06-21 RX ORDER — NAPROXEN 500 MG/1
500 TABLET ORAL
Qty: 20 TAB | Refills: 0 | Status: SHIPPED | OUTPATIENT
Start: 2020-06-21 | End: 2020-08-20

## 2020-06-21 RX ORDER — KETOROLAC TROMETHAMINE 30 MG/ML
30 INJECTION, SOLUTION INTRAMUSCULAR; INTRAVENOUS
Status: COMPLETED | OUTPATIENT
Start: 2020-06-21 | End: 2020-06-21

## 2020-06-21 RX ORDER — METHOCARBAMOL 750 MG/1
750 TABLET, FILM COATED ORAL 4 TIMES DAILY
Qty: 20 TAB | Refills: 0 | Status: SHIPPED | OUTPATIENT
Start: 2020-06-21

## 2020-06-21 RX ADMIN — KETOROLAC TROMETHAMINE 30 MG: 30 INJECTION, SOLUTION INTRAMUSCULAR at 18:48

## 2020-06-21 RX ADMIN — METHOCARBAMOL TABLETS 750 MG: 750 TABLET, COATED ORAL at 19:41

## 2020-06-21 RX ADMIN — SODIUM CHLORIDE 1000 ML: 900 INJECTION, SOLUTION INTRAVENOUS at 18:49

## 2020-06-21 NOTE — ED PROVIDER NOTES
EMERGENCY DEPARTMENT HISTORY AND PHYSICAL EXAM      Date: 6/21/2020  Patient Name: Tere Holliday    History of Presenting Illness     Chief Complaint   Patient presents with    Back Pain     upper middle back pain feels like a pulled muscle; went to work but had to leave. History Provided By: Patient    HPI: Tere Holliday, 53 y.o. female with significant PMHx for HIV, presents by POV to the ED with cc of left upper back pain. She notes that she awoke this morning with pain that is progressively worsened throughout the day. The pain was so severe that when she got to work at [employer] she had to leave. The pain is a constant pain located in the left scapular region that is nonradiating. The pain worsens with deep breathing and movement. She notes mild associated shortness of breath and chest pain. There is been no nausea, vomiting, fevers, chills, or cough. She reports that her HIV is well under control. Her viral load has been undetectible and she takes her medications as prescribed. She is a smoker and no longer on hormone replacement therapy. There are no other complaints, changes, or physical findings at this time. Social Hx: Tobacco (1 ppd), EtOH (very rare), Illicit drug use (denies)     PCP: Kaleigh Palmer MD    No current facility-administered medications on file prior to encounter. Current Outpatient Medications on File Prior to Encounter   Medication Sig Dispense Refill    [DISCONTINUED] methylPREDNISolone (MEDROL DOSEPACK) 4 mg tablet Take as directed 1 Dose Pack 0    ergocalciferol (ERGOCALCIFEROL) 50,000 unit capsule       [DISCONTINUED] PROAIR HFA 90 mcg/actuation inhaler       [DISCONTINUED] doxycycline (VIBRAMYCIN) 100 mg capsule       [DISCONTINUED] methocarbamol (ROBAXIN) 750 mg tablet Take 1 Tab by mouth four (4) times daily. 40 Tab 0    [DISCONTINUED] conjugated estrogens (PREMARIN) 0.3 mg tablet Take 1 Tab by mouth daily.  60 Tab 3    elvitegravir-cobicistat-emtricitabine-tenofovir alafenamide (GENVOYA) tab tablet Take 1 Tab by mouth daily (with breakfast).  zolpidem (AMBIEN) 5 mg tablet Take 1 Tab by mouth nightly as needed for Sleep. Max Daily Amount: 5 mg. Indications: SLEEP-ONSET INSOMNIA 30 Tab 2       Past History     Past Medical History:  Past Medical History:   Diagnosis Date    HIV (human immunodeficiency virus infection) ([facility] [address]     Hx of colonoscopy with polypectomy Fall 2016    follow up in 3-5 years       Past Surgical History:  Past Surgical History:   Procedure Laterality Date    HX HYSTERECTOMY  2007    partial, ovaries intact       Family History:  Family History   Problem Relation Age of Onset    Cancer Mother         stomach    Ovarian Cancer Mother     Cancer Father     Prostate Cancer Father     Lung Cancer Sister        Social History:  Social History     Tobacco Use    Smoking status: Current Every Day Smoker     Packs/day: 1.00    Smokeless tobacco: Never Used   Substance Use Topics    Alcohol use: Yes     Comment: occasionally    Drug use: No       Allergies: Allergies   Allergen Reactions    Percocet [Oxycodone-Acetaminophen] Itching         Review of Systems   Review of Systems   Constitutional: Negative for chills, diaphoresis and fever. HENT: Negative for congestion, ear pain, rhinorrhea and sore throat. Respiratory: Positive for shortness of breath. Negative for cough and wheezing. Cardiovascular: Positive for chest pain. Negative for palpitations and leg swelling. Gastrointestinal: Negative for abdominal pain, constipation, diarrhea, nausea and vomiting. Genitourinary: Negative for difficulty urinating, dysuria, frequency and hematuria. Musculoskeletal: Positive for back pain. Negative for arthralgias and myalgias. Neurological: Negative for headaches. All other systems reviewed and are negative. Physical Exam   Physical Exam  Vitals signs and nursing note reviewed. Constitutional:       General: She is not in acute distress. Appearance: She is well-developed. She is not diaphoretic. Comments: 53 y.o. -American female    HENT:      Head: Normocephalic and atraumatic. Eyes:      General:         Right eye: No discharge. Left eye: No discharge. Conjunctiva/sclera: Conjunctivae normal.   Neck:      Musculoskeletal: Normal range of motion and neck supple. Cardiovascular:      Rate and Rhythm: Normal rate and regular rhythm. Heart sounds: Normal heart sounds. No murmur. Pulmonary:      Effort: Pulmonary effort is normal. No respiratory distress. Breath sounds: Normal breath sounds. Chest:      Chest wall: No tenderness. Abdominal:      General: Abdomen is flat. There is no distension. Tenderness: There is no abdominal tenderness. Musculoskeletal:      Comments: BACK: Normal spinal curvatures. No step off or deformity. NT to palpation. Negative seated SLR bilaterally. Strength of the LE 5/5 and equal bilaterally. Ambulatory without difficulty. Skin:     General: Skin is warm and dry. Neurological:      Mental Status: She is alert and oriented to person, place, and time.    Psychiatric:         Behavior: Behavior normal.         Diagnostic Study Results     Labs -     Recent Results (from the past 12 hour(s))   EKG, 12 LEAD, INITIAL    Collection Time: 06/21/20  6:01 PM   Result Value Ref Range    Ventricular Rate 83 BPM    Atrial Rate 83 BPM    P-R Interval 176 ms    QRS Duration 88 ms    Q-T Interval 374 ms    QTC Calculation (Bezet) 439 ms    Calculated P Axis 70 degrees    Calculated R Axis 4 degrees    Calculated T Axis 53 degrees    Diagnosis       Normal sinus rhythm  Possible Left atrial enlargement  When compared with ECG of 24-AUG-2015 09:43,  No significant change was found     CBC WITH AUTOMATED DIFF    Collection Time: 06/21/20  6:29 PM   Result Value Ref Range    WBC 4.2 3.6 - 11.0 K/uL    RBC 4.74 3.80 - 5.20 M/uL    HGB 13.3 11.5 - 16.0 g/dL    HCT 40.7 35.0 - 47.0 %    MCV 85.9 80.0 - 99.0 FL    MCH 28.1 26.0 - 34.0 PG    MCHC 32.7 30.0 - 36.5 g/dL    RDW 15.2 (H) 11.5 - 14.5 %    PLATELET 710 167 - 989 K/uL    MPV 11.7 8.9 - 12.9 FL    NRBC 0.0 0  WBC    ABSOLUTE NRBC 0.00 0.00 - 0.01 K/uL    NEUTROPHILS 40 32 - 75 %    LYMPHOCYTES 46 12 - 49 %    MONOCYTES 11 5 - 13 %    EOSINOPHILS 2 0 - 7 %    BASOPHILS 1 0 - 1 %    IMMATURE GRANULOCYTES 0 0.0 - 0.5 %    ABS. NEUTROPHILS 1.7 (L) 1.8 - 8.0 K/UL    ABS. LYMPHOCYTES 2.0 0.8 - 3.5 K/UL    ABS. MONOCYTES 0.5 0.0 - 1.0 K/UL    ABS. EOSINOPHILS 0.1 0.0 - 0.4 K/UL    ABS. BASOPHILS 0.0 0.0 - 0.1 K/UL    ABS. IMM. GRANS. 0.0 0.00 - 0.04 K/UL    DF AUTOMATED     METABOLIC PANEL, COMPREHENSIVE    Collection Time: 06/21/20  6:29 PM   Result Value Ref Range    Sodium 141 136 - 145 mmol/L    Potassium 3.8 3.5 - 5.1 mmol/L    Chloride 110 (H) 97 - 108 mmol/L    CO2 25 21 - 32 mmol/L    Anion gap 6 5 - 15 mmol/L    Glucose 78 65 - 100 mg/dL    BUN 17 6 - 20 MG/DL    Creatinine 0.69 0.55 - 1.02 MG/DL    BUN/Creatinine ratio 25 (H) 12 - 20      GFR est AA >60 >60 ml/min/1.73m2    GFR est non-AA >60 >60 ml/min/1.73m2    Calcium 8.5 8.5 - 10.1 MG/DL    Bilirubin, total 0.2 0.2 - 1.0 MG/DL    ALT (SGPT) 20 12 - 78 U/L    AST (SGOT) 24 15 - 37 U/L    Alk.  phosphatase 57 45 - 117 U/L    Protein, total 7.3 6.4 - 8.2 g/dL    Albumin 3.6 3.5 - 5.0 g/dL    Globulin 3.7 2.0 - 4.0 g/dL    A-G Ratio 1.0 (L) 1.1 - 2.2     TROPONIN I    Collection Time: 06/21/20  6:29 PM   Result Value Ref Range    Troponin-I, Qt. <0.05 <0.05 ng/mL   CK W/ REFLX CKMB    Collection Time: 06/21/20  6:29 PM   Result Value Ref Range    CK 91 26 - 192 U/L   D DIMER    Collection Time: 06/21/20  6:29 PM   Result Value Ref Range    D-dimer <0.19 0.00 - 0.65 mg/L FEU   LIPASE    Collection Time: 06/21/20  6:29 PM   Result Value Ref Range    Lipase 93 73 - 393 U/L       Radiologic Studies - None    Medical Decision Making   I am the first provider for this patient. I reviewed the vital signs, available nursing notes, past medical history, past surgical history, family history and social history. Vital Signs-Reviewed the patient's vital signs. Patient Vitals for the past 12 hrs:   Temp Pulse Resp BP SpO2   06/21/20 1753 99.2 °F (37.3 °C) 96 18 133/61 99 %       Records Reviewed: Nursing Notes    Provider Notes (Medical Decision Making):   Patient presents the ED with upper back pain that is worsened with deep breathing. Differential diagnoses include PE, sprain, strain, spasm, DDD, DJD, gallbladder disease, GERD. ED work-up is negative including d-dimer. Will treat patient for musculoskeletal back pain. I have encouraged patient to stop smoking. ED Course:   Initial assessment performed. The patients presenting problems have been discussed, and they are in agreement with the care plan formulated and outlined with them. I have encouraged them to ask questions as they arise throughout their visit. Tobacco Counseling  Discussed the risks of smoking and the benefits of smoking cessation as well as the long term sequelae of smoking with the patient. The patient verbalized their understanding. Counseled patient for approximately 3 minutes. Critical Care Time: None    Disposition:  DISCHARGE NOTE:  The pt is ready for discharge. The pt's signs, symptoms, diagnosis, and discharge instructions have been discussed and pt has conveyed their understanding. The pt is to follow up as recommended or return to ER should their symptoms worsen. Plan has been discussed and pt is in agreement. PLAN:  1. Discharge Medication List as of 6/21/2020  7:20 PM      START taking these medications    Details   methocarbamoL (Robaxin-750) 750 mg tablet Take 1 Tab by mouth four (4) times daily. , Normal, Disp-20 Tab, R-0      naproxen (NAPROSYN) 500 mg tablet Take 1 Tab by mouth every twelve (12) hours as needed for Pain., Normal, Disp-20 Tab, R-0      traMADoL (Ultram) 50 mg tablet Take 1 Tab by mouth every six (6) hours as needed for Pain for up to 3 days. Max Daily Amount: 200 mg., Normal, Disp-20 Tab, R-0         CONTINUE these medications which have NOT CHANGED    Details   ergocalciferol (ERGOCALCIFEROL) 50,000 unit capsule Historical Med      elvitegravir-cobicistat-emtricitabine-tenofovir alafenamide (GENVOYA) tab tablet Take 1 Tab by mouth daily (with breakfast). , Historical Med      zolpidem (AMBIEN) 5 mg tablet Take 1 Tab by mouth nightly as needed for Sleep. Max Daily Amount: 5 mg. Indications: SLEEP-ONSET INSOMNIA, Print, Disp-30 Tab, R-2           2. Follow-up Information     Follow up With Specialties Details Why Contact Info    Burgess Juan MD Orthopedic Surgery In 1 week  [address]  [phone]      Raylene Claude, MD [facility], Sports Medicine In 1 week  [address] [place] [address]  [phone]          Return to ED if worse     Diagnosis     Clinical Impression:   1. Back strain, initial encounter    2. Upper back pain    3. SOB (shortness of breath)    4. Tobacco abuse          Please note that this dictation was completed with Clari, the computer voice recognition software. Quite often unanticipated grammatical, syntax, homophones, and other interpretive errors are inadvertently transcribed by the computer software. Please disregards these errors. Please excuse any errors that have escaped final proofreading. This note will not be viewable in [address]

## 2020-06-21 NOTE — ED NOTES
KATERIN Estrella has reviewed discharge instructions with the patient. The patient verbalized understanding, denies further questions  Patient ambulatory out of ED at this time.

## 2020-06-21 NOTE — LETTER
[address] 
[facility] EMERGENCY DEPT 
[address] [facility] [place] [address] 
[phone] Work/School Note Date: 6/21/2020 To Whom It May concern: 
 
Liz Joy was seen and treated today in the emergency room by the following provider(s): 
Attending Provider: Na Brand DO Physician Assistant: Nathan Hilario, Celsa Watson. Please excuse Liz Joy from work today and tomorrow. Sincerely, Ernie Sims, [address]

## 2020-06-21 NOTE — DISCHARGE INSTRUCTIONS
Patient Education        Back Pain: Care Instructions  Your Care Instructions     Back pain has many possible causes. It is often related to problems with muscles and ligaments of the back. It may also be related to problems with the nerves, discs, or bones of the back. Moving, lifting, standing, sitting, or sleeping in an awkward way can strain the back. Sometimes you don't notice the injury until later. Arthritis is another common cause of back pain. Although it may hurt a lot, back pain usually improves on its own within several weeks. Most people recover in 12 weeks or less. Using good home treatment and being careful not to stress your back can help you feel better sooner. Follow-up care is a key part of your treatment and safety. Be sure to make and go to all appointments, and call your doctor if you are having problems. It's also a good idea to know your test results and keep a list of the medicines you take. How can you care for yourself at home? · Sit or lie in positions that are most comfortable and reduce your pain. Try one of these positions when you lie down:  ? Lie on your back with your knees bent and supported by large pillows. ? Lie on the floor with your legs on the seat of a sofa or chair. ? Lie on your side with your knees and hips bent and a pillow between your legs. ? Lie on your stomach if it does not make pain worse. · Do not sit up in bed, and avoid soft couches and twisted positions. Bed rest can help relieve pain at first, but it delays healing. Avoid bed rest after the first day of back pain. · Change positions every 30 minutes. If you must sit for long periods of time, take breaks from sitting. Get up and walk around, or lie in a comfortable position. · Try using a heating pad on a low or medium setting for 15 to 20 minutes every 2 or 3 hours. Try a warm shower in place of one session with the heating pad. · You can also try an ice pack for 10 to 15 minutes every 2 to 3 hours. Put a thin cloth between the ice pack and your skin. · Take pain medicines exactly as directed. ? If the doctor gave you a prescription medicine for pain, take it as prescribed. ? If you are not taking a prescription pain medicine, ask your doctor if you can take an over-the-counter medicine. · Take short walks several times a day. You can start with 5 to 10 minutes, 3 or 4 times a day, and work up to longer walks. Walk on level surfaces and avoid hills and stairs until your back is better. · Return to work and other activities as soon as you can. Continued rest without activity is usually not good for your back. · To prevent future back pain, do exercises to stretch and strengthen your back and stomach. Learn how to use good posture, safe lifting techniques, and proper body mechanics. When should you call for help? Call your doctor now or seek immediate medical care if:  · You have new or worsening numbness in your legs. · You have new or worsening weakness in your legs. (This could make it hard to stand up.)  · You lose control of your bladder or bowels. Watch closely for changes in your health, and be sure to contact your doctor if:  · You have a fever, lose weight, or don't feel well. · You do not get better as expected. Where can you learn more? Go to http://zuri-juan pablo.info/  Enter I594 in the search box to learn more about \"Back Pain: Care Instructions. \"  Current as of: March 2, 2020               Content Version: 12.5  © 1903-0394 Healthwise[employer]. Care instructions adapted under license by [employer] (which disclaims liability or warranty for this information). If you have questions about a medical condition or this instruction, always ask your healthcare professional. [place] [address] any warranty or liability for your use of this information.        Patient Education        Shortness of Breath: Care Instructions  Your Care Instructions  Shortness of breath has many causes. Sometimes conditions such as anxiety can lead to shortness of breath. Some people get mild shortness of breath when they exercise. Trouble breathing also can be a symptom of a serious problem, such as asthma, lung disease, emphysema, heart problems, and pneumonia. If your shortness of breath continues, you may need tests and treatment. Watch for any changes in your breathing and other symptoms. Follow-up care is a key part of your treatment and safety. Be sure to make and go to all appointments, and call your doctor if you are having problems. It's also a good idea to know your test results and keep a list of the medicines you take. How can you care for yourself at home? · Do not smoke or allow others to smoke around you. If you need help quitting, talk to your doctor about stop-smoking programs and medicines. These can increase your chances of quitting for good. · Get plenty of rest and sleep. · Take your medicines exactly as prescribed. Call your doctor if you think you are having a problem with your medicine. · Find healthy ways to deal with stress. ? Exercise daily. ? Get plenty of sleep. ? Eat regularly and well. When should you call for help? [id number] anytime you think you may need emergency care. For example, call if:  · You have severe shortness of breath. · You have symptoms of a heart attack. These may include:  ? Chest pain or pressure, or a strange feeling in the chest.  ? Sweating. ? Shortness of breath. ? Nausea or vomiting. ? Pain, pressure, or a strange feeling in the back, neck, jaw, or upper belly or in one or both shoulders or arms. ? Lightheadedness or sudden weakness. ? A fast or irregular heartbeat. After you call 911, the  may tell you to chew 1 adult-strength or 2 to 4 low-dose aspirin. Wait for an ambulance. Do not try to drive yourself.   Call your doctor now or seek immediate medical care if:  · Your shortness of breath gets worse or you start to wheeze. Wheezing is a high-pitched sound when you breathe. · You wake up at night out of breath or have to prop your head up on several pillows to breathe. · You are short of breath after only light activity or while at rest.  Watch closely for changes in your health, and be sure to contact your doctor if:  · You do not get better over the next 1 to 2 days. Where can you learn more? Go to http://zuri-juan pablo.info/  Enter S780 in the search box to learn more about \"Shortness of Breath: Care Instructions. \"  Current as of: February 24, 2020               Content Version: 12.5  © 2006-2020 Healthwise, [employer]. Care instructions adapted under license by [employer] (which disclaims liability or warranty for this information). If you have questions about a medical condition or this instruction, always ask your healthcare professional. [address] any warranty or liability for your use of this information.

## 2020-06-22 LAB
ATRIAL RATE: 83 BPM
CALCULATED P AXIS, ECG09: 70 DEGREES
CALCULATED R AXIS, ECG10: 4 DEGREES
CALCULATED T AXIS, ECG11: 53 DEGREES
DIAGNOSIS, 93000: NORMAL
P-R INTERVAL, ECG05: 176 MS
Q-T INTERVAL, ECG07: 374 MS
QRS DURATION, ECG06: 88 MS
QTC CALCULATION (BEZET), ECG08: 439 MS
VENTRICULAR RATE, ECG03: 83 BPM

## 2020-06-23 ENCOUNTER — VIRTUAL VISIT (OUTPATIENT)
Dept: INTERNAL MEDICINE CLINIC | Age: 55
End: 2020-06-23

## 2020-06-23 DIAGNOSIS — M62.830 SPASM OF BACK MUSCLES: Primary | ICD-10-CM

## 2020-06-23 RX ORDER — PREDNISONE 20 MG/1
20 TABLET ORAL 3 TIMES DAILY
Qty: 21 TAB | Refills: 0 | Status: SHIPPED | OUTPATIENT
Start: 2020-06-23 | End: 2020-06-30

## 2020-06-23 NOTE — LETTER
NOTIFICATION RETURN TO WORK / SCHOOL 
 
6/23/2020 11:17 AM 
 
Ms. Cristo Lea [address] [facility] [id number] To Whom It May Concern: 
 
Cristo Lea is currently under the care of Susana Naranjo. She will return to work/school on: 6/29/20 If there are questions or concerns please have the patient contact our office. Sincerely, Joann Lamar MD

## 2020-06-23 NOTE — PROGRESS NOTES
Gary Chacon is a 53 y.o. female who was seen by synchronous (real-time) audio-video technology on 6/23/2020. Consent: Gary Chacon, who was seen by synchronous (real-time) audio-video technology, and/or her healthcare decision maker, is aware that this patient-initiated, Telehealth encounter on 6/23/2020 is a billable service, with coverage as determined by her insurance carrier. She is aware that she may receive a bill and has provided verbal consent to proceed: Yes. Assessment & Plan:   Diagnoses and all orders for this visit:    1. Spasm of back muscles    Other orders  -     predniSONE (DELTASONE) 20 mg tablet; Take 20 mg by mouth three (3) times daily for 7 days. Follow-up in 2 weeks if no resolution, consider trigger point injection        Subjective:   Gary Chacon is a 53 y.o. female who was seen for upper neck pain. Patient states that she has had upper neck pain for 2 days and was diagnosed with back spasm by ER. Her heart was ruled out results because with lab work and EKG done. She is not had any imaging done of her neck. States that she has had increased stress at work. Pain is about 10 out of 10 in states that she had some relief with massage. Prior to Admission medications    Medication Sig Start Date End Date Taking? Authorizing Provider   predniSONE (DELTASONE) 20 mg tablet Take 20 mg by mouth three (3) times daily for 7 days. 6/23/20 6/30/20 Yes Deonna Allen MD   methocarbamoL (Robaxin-750) 750 mg tablet Take 1 Tab by mouth four (4) times daily. 6/21/20   Norm KATERIN Doan   naproxen (NAPROSYN) 500 mg tablet Take 1 Tab by mouth every twelve (12) hours as needed for Pain. 6/21/20   Norm KATERIN Doan   traMADoL (Ultram) 50 mg tablet Take 1 Tab by mouth every six (6) hours as needed for Pain for up to 3 days.  Max Daily Amount: 200 mg. 6/21/20 6/24/20  Norm KATERIN Doan   ergocalciferol (ERGOCALCIFEROL) 50,000 unit capsule  5/19/19   Provider, Historical elvitegravir-cobicistat-emtricitabine-tenofovir alafenamide (GENVOYA) tab tablet Take 1 Tab by mouth daily (with breakfast). Provider, Historical   zolpidem (AMBIEN) 5 mg tablet Take 1 Tab by mouth nightly as needed for Sleep. Max Daily Amount: 5 mg. Indications: SLEEP-ONSET INSOMNIA 2/2/17   Ted Mcdonnell MD     Allergies   Allergen Reactions    Percocet [Oxycodone-Acetaminophen] Itching       Patient Active Problem List    Diagnosis Date Noted    HIV positive ([facility] [address] 11/14/2016    Smoker 11/07/2016    Needs smoking cessation education 11/07/2016     Current Outpatient Medications   Medication Sig Dispense Refill    predniSONE (DELTASONE) 20 mg tablet Take 20 mg by mouth three (3) times daily for 7 days. 21 Tab 0    methocarbamoL (Robaxin-750) 750 mg tablet Take 1 Tab by mouth four (4) times daily. 20 Tab 0    naproxen (NAPROSYN) 500 mg tablet Take 1 Tab by mouth every twelve (12) hours as needed for Pain. 20 Tab 0    traMADoL (Ultram) 50 mg tablet Take 1 Tab by mouth every six (6) hours as needed for Pain for up to 3 days. Max Daily Amount: 200 mg. 20 Tab 0    ergocalciferol (ERGOCALCIFEROL) 50,000 unit capsule       elvitegravir-cobicistat-emtricitabine-tenofovir alafenamide (GENVOYA) tab tablet Take 1 Tab by mouth daily (with breakfast).  zolpidem (AMBIEN) 5 mg tablet Take 1 Tab by mouth nightly as needed for Sleep. Max Daily Amount: 5 mg. Indications: SLEEP-ONSET INSOMNIA 30 Tab 2     Allergies   Allergen Reactions    Percocet [Oxycodone-Acetaminophen] Itching     Past Medical History:   Diagnosis Date    HIV (human immunodeficiency virus infection) ([facility] [address]     Hx of colonoscopy with polypectomy Fall 2016    follow up in 3-5 years     Past Surgical History:   Procedure Laterality Date    HX HYSTERECTOMY  2007    partial, ovaries intact       Review of Systems   All other systems reviewed and are negative.       Objective:   Vital Signs: (As obtained by patient/caregiver at home)  There were no vitals taken for this visit. [INSTRUCTIONS:  \"[x]\" Indicates a positive item  \"[]\" Indicates a negative item  -- DELETE ALL ITEMS NOT EXAMINED]    Constitutional: [x] Appears well-developed and well-nourished [x] No apparent distress      [] Abnormal -     Mental status: [x] Alert and awake  [x] Oriented to person/place/time [x] Able to follow commands    [] Abnormal -     Eyes:   EOM    [x]  Normal    [] Abnormal -   Sclera  [x]  Normal    [] Abnormal -          Discharge [x]  None visible   [] Abnormal -     HENT: [x] Normocephalic, atraumatic  [] Abnormal -   [x] Mouth/Throat: Mucous membranes are moist    External Ears [x] Normal  [] Abnormal -    Neck: [x] No visualized mass [] Abnormal -     Pulmonary/Chest: [x] Respiratory effort normal   [x] No visualized signs of difficulty breathing or respiratory distress        [] Abnormal -      Musculoskeletal:   [x] Normal gait with no signs of ataxia         [x] Normal range of motion of neck        [] Abnormal -     Neurological:        [x] No Facial Asymmetry (Cranial nerve 7 motor function) (limited exam due to video visit)          [x] No gaze palsy        [] Abnormal -          Skin:        [x] No significant exanthematous lesions or discoloration noted on facial skin         [] Abnormal -            Psychiatric:       [x] Normal Affect [] Abnormal -        [x] No Hallucinations    Other pertinent observable physical exam findings:-        We discussed the expected course, resolution and complications of the diagnosis(es) in detail. Medication risks, benefits, costs, interactions, and alternatives were discussed as indicated. I advised her to contact the office if her condition worsens, changes or fails to improve as anticipated. She expressed understanding with the diagnosis(es) and plan. Allyn Torres is a 53 y.o. female who was evaluated by a video visit encounter for concerns as above.  Patient identification was verified prior to start of the visit. A caregiver was present when appropriate. Due to this being a TeleHealth encounter (During QUTVT-16 public health emergency), evaluation of the following organ systems was limited: Vitals/Constitutional/EENT/Resp/CV/GI//MS/Neuro/Skin/Heme-Lymph-Imm. Pursuant to the emergency declaration under the [facility], [facility]5 waiver authority and the [employer] and [employer] Act, this Virtual  Visit was conducted, with patient's (and/or legal guardian's) consent, to reduce the patient's risk of exposure to COVID-19 and provide necessary medical care. Services were provided through a video synchronous discussion virtually to substitute for in-person clinic visit. Patient and provider were located at their individual homes.       Jayro Godoy MD

## 2020-08-20 ENCOUNTER — HOSPITAL ENCOUNTER (EMERGENCY)
Age: 55
Discharge: HOME | End: 2020-08-20
Attending: EMERGENCY MEDICINE
Payer: COMMERCIAL

## 2020-08-20 ENCOUNTER — APPOINTMENT (OUTPATIENT)
Dept: GENERAL RADIOLOGY | Age: 55
End: 2020-08-20
Attending: PHYSICIAN ASSISTANT
Payer: COMMERCIAL

## 2020-08-20 VITALS
TEMPERATURE: 99.5 F | SYSTOLIC BLOOD PRESSURE: 136 MMHG | HEART RATE: 71 BPM | RESPIRATION RATE: 16 BRPM | OXYGEN SATURATION: 100 % | HEIGHT: 66 IN | DIASTOLIC BLOOD PRESSURE: 67 MMHG | WEIGHT: 148.59 LBS | BODY MASS INDEX: 23.88 KG/M2

## 2020-08-20 DIAGNOSIS — F17.200 SMOKING ADDICTION: ICD-10-CM

## 2020-08-20 DIAGNOSIS — M79.641 HAND PAIN, RIGHT: Primary | ICD-10-CM

## 2020-08-20 DIAGNOSIS — R20.2 PARESTHESIA OF BOTH HANDS: ICD-10-CM

## 2020-08-20 PROCEDURE — 73130 X-RAY EXAM OF HAND: CPT

## 2020-08-20 PROCEDURE — 99283 EMERGENCY DEPT VISIT LOW MDM: CPT

## 2020-08-20 RX ORDER — TRAMADOL HYDROCHLORIDE 50 MG/1
50 TABLET ORAL
Qty: 9 TAB | Refills: 0 | Status: SHIPPED | OUTPATIENT
Start: 2020-08-20 | End: 2020-08-23

## 2020-08-20 NOTE — ED NOTES
Pam Schroeder PA-C reviewed discharge instructions with the patient. The patient verbalized understanding. All questions and concerns were addressed. The patient declined a wheelchair and is discharged ambulatory in the care of family members with instructions and prescriptions in hand. Pt is alert and oriented x 4. Respirations are clear and unlabored.

## 2020-08-20 NOTE — ED PROVIDER NOTES
EMERGENCY DEPARTMENT HISTORY AND PHYSICAL EXAM      Date: 8/20/2020  Patient Name: Brendan Tapia    History of Presenting Illness     Chief Complaint   Patient presents with    Hand Pain     bilateral hand numbness, wakes her up in the night. This morning she also noted rt hand swelling       History Provided By: Patient    HPI: Brendan Tapia, 53 y.o. female with a history of HIV presents ambulatory to the ED with cc of less than a day of 9 out of 10 constant, aching right hand pain that is worse with movement and palpation and for which there has been no specific injury. She tells me she battles bilateral hand numbness and is followed by [facility] neurology. She was prescribed gabapentin yesterday and has taken a couple doses as prescribed since. She tells me her work requires a lot of repetitive movement and heavy lifting and she is expected to go back today. She is right-hand dominant. There has been no chest pain or shortness of breath. There are no other complaints, changes, or physical findings at this time. PCP: Noah Bryant MD    Current Outpatient Medications   Medication Sig Dispense Refill    traMADoL (ULTRAM) 50 mg tablet Take 1 Tab by mouth every eight (8) hours as needed for Pain for up to 3 days. Max Daily Amount: 150 mg. Indications: neuropathic pain 9 Tab 0    methocarbamoL (Robaxin-750) 750 mg tablet Take 1 Tab by mouth four (4) times daily. 20 Tab 0    ergocalciferol (ERGOCALCIFEROL) 50,000 unit capsule       elvitegravir-cobicistat-emtricitabine-tenofovir alafenamide (GENVOYA) tab tablet Take 1 Tab by mouth daily (with breakfast).  zolpidem (AMBIEN) 5 mg tablet Take 1 Tab by mouth nightly as needed for Sleep. Max Daily Amount: 5 mg.  Indications: SLEEP-ONSET INSOMNIA 30 Tab 2     Past History     Past Medical History:  Past Medical History:   Diagnosis Date    HIV [facility]human immunodeficiency virus infection) ([facility] [address]     Hx of colonoscopy with polypectomy Fall 2016    follow up in 3-5 years       Past Surgical History:  Past Surgical History:   Procedure Laterality Date    HX HYSTERECTOMY  2007    partial, ovaries intact       Family History:  Family History   Problem Relation Age of Onset    Cancer Mother         stomach    Ovarian Cancer Mother     Cancer Father     Prostate Cancer Father     Lung Cancer Sister        Social History:  Social History     Tobacco Use    Smoking status: Current Every Day Smoker     Packs/day: 1.00    Smokeless tobacco: Never Used   Substance Use Topics    Alcohol use: Yes     Comment: occasionally    Drug use: No       Allergies: Allergies   Allergen Reactions    Percocet [Oxycodone-Acetaminophen] Itching     Review of Systems   Review of Systems   Constitutional: Negative for fatigue and fever. HENT: Negative for ear pain and sore throat. Eyes: Negative for pain, redness and visual disturbance. Respiratory: Negative for cough and shortness of breath. Cardiovascular: Negative for chest pain and palpitations. Gastrointestinal: Negative for abdominal pain, nausea and vomiting. Genitourinary: Negative for dysuria, frequency and urgency. Musculoskeletal: Negative for back pain, gait problem, neck pain and neck stiffness. Right hand pain   Skin: Negative for rash and wound. Neurological: Negative for dizziness, weakness, light-headedness, numbness and headaches. Physical Exam   Physical Exam  Vitals signs and nursing note reviewed. Constitutional:       General: She is not in acute distress. Appearance: She is well-developed. She is not toxic-appearing. HENT:      Head: Normocephalic and atraumatic. Jaw: No trismus. Right Ear: External ear normal.      Left Ear: External ear normal.      Nose: Nose normal.      Mouth/Throat:      Pharynx: Uvula midline. Eyes:      General: No scleral icterus. Conjunctiva/sclera: Conjunctivae normal.      Pupils: Pupils are equal, round, and reactive to light. Neck:      Musculoskeletal: Full passive range of motion without pain and normal range of motion. Cardiovascular:      Rate and Rhythm: Normal rate and regular rhythm. Pulmonary:      Effort: Pulmonary effort is normal. No tachypnea, accessory muscle usage or respiratory distress. Breath sounds: No decreased breath sounds or wheezing. Abdominal:      Palpations: Abdomen is soft. Tenderness: There is no abdominal tenderness. Musculoskeletal: Normal range of motion. Right hand: She exhibits tenderness. Comments:   RIGHT HAND:  No bruising, redness or obvious swelling  Essentially full active range of motion with pain with full flexion of the fingers  Diffuse, primarily dorsal tenderness of the hand   Skin:     Findings: No rash. Neurological:      Mental Status: She is alert and oriented to person, place, and time. She is not disoriented. GCS: GCS eye subscore is 4. GCS verbal subscore is 5. GCS motor subscore is 6. Cranial Nerves: No cranial nerve deficit. Psychiatric:         Speech: Speech normal.       Diagnostic Study Results     Labs -   No results found for this or any previous visit (from the past 12 hour(s)). Radiologic Studies -   XR HAND RT MIN 3 V   Final Result   IMPRESSION:       Normal right hand views. CT Results  (Last 48 hours)    None        CXR Results  (Last 48 hours)    None        Medical Decision Making   I am the first provider for this patient. I reviewed the vital signs, available nursing notes, past medical history, past surgical history, family history and social history. Vital Signs-Reviewed the patient's vital signs.   Patient Vitals for the past 12 hrs:   Temp Pulse Resp BP SpO2   08/20/20 1102 99.5 °F (37.5 °C) 71 16 136/67 100 %       Pulse Oximetry Analysis - 100% on RA    Records Reviewed: Nursing Notes, Old Medical Records, Previous Radiology Studies, Previous Laboratory Studies and     Provider Notes (Medical Decision Making):   DDx: Fracture, DJD, paresthesia, strain    TOBACCO COUNSELING:  Spent 1-5 minutes discussing the risks of smoking and the benefits of smoking cessation as well as the long term sequelae of smoking with the pt who verbalized his understanding. Reviewed strategies for success, including gradually decreasing the number of cigarettes smoked a day. ED Course:   Initial assessment performed. The patients presenting problems have been discussed, and they are in agreement with the care plan formulated and outlined with them. I have encouraged them to ask questions as they arise throughout their visit. Disposition:  Discharge    PLAN:  1. Discharge Medication List as of 8/20/2020  1:57 PM      START taking these medications    Details   traMADoL (ULTRAM) 50 mg tablet Take 1 Tab by mouth every eight (8) hours as needed for Pain for up to 3 days. Max Daily Amount: 150 mg. Indications: neuropathic pain, Normal, Disp-9 Tab,R-0         CONTINUE these medications which have NOT CHANGED    Details   methocarbamoL (Robaxin-750) 750 mg tablet Take 1 Tab by mouth four (4) times daily. , Normal, Disp-20 Tab, R-0      ergocalciferol (ERGOCALCIFEROL) 50,000 unit capsule Historical Med      elvitegravir-cobicistat-emtricitabine-tenofovir alafenamide (GENVOYA) tab tablet Take 1 Tab by mouth daily (with breakfast). , Historical Med      zolpidem (AMBIEN) 5 mg tablet Take 1 Tab by mouth nightly as needed for Sleep. Max Daily Amount: 5 mg. Indications: SLEEP-ONSET INSOMNIA, Print, Disp-30 Tab, R-2           2. Follow-up Information     Follow up With Specialties Details Why Contact Info    Magnus Harvey MD Family Medicine, Sports Medicine Schedule an appointment as soon as possible for a visit As needed [address]  [place] [id number]  [phone]          Return to ED if worse     Diagnosis     Clinical Impression:   1. Hand pain, right    2. Paresthesia of both hands    3.  Smoking addiction            I was personally available for consultation in the emergency department. I have reviewed the chart and agree with the documentation recorded by the mid level provider, including the assessment, treatment plan, and disposition.   Lequita Essex, MD

## 2020-08-20 NOTE — LETTER
[address] 
[facility] EMERGENCY DEPT 
[address] [place] [address] [place] [id number] 
[phone] Work/School Note Date: 8/20/2020 To Whom It May concern: 
 
Norbert Thompson was seen and treated today in the emergency room by the following provider(s): 
Attending Provider: Felipe Fair MD 
Physician Assistant: KATERIN Meyers. Norbert Thompsno may return to work on 18AUG5. Sincerely, KATERIN Pink

## 2020-08-20 NOTE — ED NOTES
Pt reports bilateral hand numbness that has been going on \"for a minute\". Pt is seeing neurology for that.   She comes in this morning because of swelling and pain in her rt hand that she woke up with this morning

## 2020-08-21 ENCOUNTER — OFFICE VISIT (OUTPATIENT)
Dept: INTERNAL MEDICINE CLINIC | Age: 55
End: 2020-08-21
Payer: COMMERCIAL

## 2020-08-21 VITALS
RESPIRATION RATE: 14 BRPM | OXYGEN SATURATION: 100 % | TEMPERATURE: 98 F | DIASTOLIC BLOOD PRESSURE: 75 MMHG | SYSTOLIC BLOOD PRESSURE: 116 MMHG | HEART RATE: 70 BPM | WEIGHT: 150.3 LBS | BODY MASS INDEX: 24.15 KG/M2 | HEIGHT: 66 IN

## 2020-08-21 DIAGNOSIS — M25.441 SWELLING OF FINGER JOINT OF RIGHT HAND: Primary | ICD-10-CM

## 2020-08-21 PROCEDURE — 99214 OFFICE O/P EST MOD 30 MIN: CPT | Performed by: FAMILY MEDICINE

## 2020-08-21 RX ORDER — PREDNISONE 20 MG/1
20 TABLET ORAL 3 TIMES DAILY
Qty: 21 TAB | Refills: 0 | Status: SHIPPED | OUTPATIENT
Start: 2020-08-21 | End: 2020-08-28

## 2020-08-21 NOTE — PROGRESS NOTES
No chief complaint on file. she is a 53y.o. year old female who presents for evaluation of swelling in right hand  Pain Assessment Encounter      Domingo Blank  8/21/2020  Onset of Symptoms: 8/19/20  ________________________________________________________________________  Description:hand swelling and pain    Frequency: once a day  Pain Scale:(1-10): 10  Trauma Hx: no injury  Hx of similar symptoms: No  Radiation: NO  Duration:  continuous      Progression: is unchanged  What makes it better?: heat  What makes it worse?:strecthing  Medications tried: acetaminophen, ibuprofen    Reviewed and agree with Nurse Note and duplicated in this note. Reviewed PmHx, RxHx, FmHx, SocHx, AllgHx and updated and dated in the chart.     Family History   Problem Relation Age of Onset    Cancer Mother         stomach    Ovarian Cancer Mother     Cancer Father     Prostate Cancer Father     Lung Cancer Sister        Past Medical History:   Diagnosis Date    [facility] immunodeficiency virus infection) [facility] [address]     Hx of colonoscopy with polypectomy Fall 2016    follow up in 3-5 years      Social History     Socioeconomic History    Marital status: SINGLE     Spouse name: Not on file    Number of children: Not on file    Years of education: Not on file    Highest education level: Not on file   Tobacco Use    Smoking status: Current Every Day Smoker     Packs/day: 1.00    Smokeless tobacco: Never Used   Substance and Sexual Activity    Alcohol use: Yes     Comment: occasionally    Drug use: No    Sexual activity: Yes     Partners: Male     Birth control/protection: Surgical        Review of Systems - negative except as listed above      Objective:     Vitals:    08/21/20 1137   BP: 116/75   Pulse: 70   Resp: 14   Temp: 98 °F (36.7 °C)   TempSrc: Oral   SpO2: 100%   Weight: 150 lb 4.8 oz (68.2 kg)   Height: 5' 6\" (1.676 m)       Physical Examination: General appearance - alert, well appearing, and in no distress  Back exam - full range of motion, no tenderness, palpable spasm or pain on motion  Neurological - alert, oriented, normal speech, no focal findings or movement disorder noted  Musculoskeletal - right hand - swelling noted on right index MTP joint, pain with palpation  Extremities - peripheral pulses normal, no pedal edema, no clubbing or cyanosis  Skin - normal coloration and turgor, no rashes, no suspicious skin lesions noted     Assessment/ Plan:   Diagnoses and all orders for this visit:    1. Swelling of finger joint of right hand    Other orders  -     predniSONE (DELTASONE) 20 mg tablet; Take 20 mg by mouth three (3) times daily for 7 days. Pathophysiology, recovery and rehabilitation process discussed and questions answered   Counseling for 30 Minutes of the total visit duration   Pictures and figures used as necessary   Provided reassurance   Monitor response to injection   Discussed steroid side effects of fat atrophy, hypopigmentation, steroid flare or infection   Recommend activity modification   Recommend  lower impact activities-walking, Eliptical, Nordic Track, cycling or swimming   Follow up in 4 week(s)             1) Remember to stay active and/or exercise regularly (I suggest 30-45 minutes daily)   2) For reliable dietary information, go to www. [employer].org. You may wish to consider seeing the nutritionist at [facility] [phone], also consider the [address] I have discussed the diagnosis with the patient and the intended plan as seen in the above orders. The patient has received an after-visit summary and questions were answered concerning future plans. Medication Side Effects and Warnings were discussed with patient,  Patient Labs were reviewed and or requested, and  Patient Past Records were reviewed and or requested  yes      Pt agrees to call or return to clinic and/or go to closest ER with any worsening of symptoms.   This may include, but not limited to increased fever (>100.4) with NSAIDS or Tylenol, increased edema, confusion, rash, worsening of presenting symptoms. Please note that this dictation was completed with [employer], the computer voice recognition software. Quite often unanticipated grammatical, syntax, homophones, and other interpretive errors are inadvertently transcribed by the computer software. Please disregard these errors. Please excuse any errors that have escaped final proofreading. Thank you.

## 2021-10-29 ENCOUNTER — OFFICE VISIT (OUTPATIENT)
Dept: INTERNAL MEDICINE CLINIC | Age: 56
End: 2021-10-29
Payer: COMMERCIAL

## 2021-10-29 VITALS
HEIGHT: 66 IN | BODY MASS INDEX: 25.17 KG/M2 | OXYGEN SATURATION: 98 % | HEART RATE: 77 BPM | DIASTOLIC BLOOD PRESSURE: 81 MMHG | WEIGHT: 156.6 LBS | SYSTOLIC BLOOD PRESSURE: 149 MMHG

## 2021-10-29 DIAGNOSIS — Z12.31 ENCOUNTER FOR SCREENING MAMMOGRAM FOR MALIGNANT NEOPLASM OF BREAST: ICD-10-CM

## 2021-10-29 DIAGNOSIS — M25.50 MULTIPLE JOINT PAIN: ICD-10-CM

## 2021-10-29 DIAGNOSIS — R03.0 ELEVATED BP WITHOUT DIAGNOSIS OF HYPERTENSION: ICD-10-CM

## 2021-10-29 DIAGNOSIS — M25.512 ACUTE PAIN OF BOTH SHOULDERS: ICD-10-CM

## 2021-10-29 DIAGNOSIS — M54.42 ACUTE LEFT-SIDED LOW BACK PAIN WITH LEFT-SIDED SCIATICA: ICD-10-CM

## 2021-10-29 DIAGNOSIS — Z00.00 WELL WOMAN EXAM (NO GYNECOLOGICAL EXAM): Primary | ICD-10-CM

## 2021-10-29 DIAGNOSIS — Z12.11 COLON CANCER SCREENING: ICD-10-CM

## 2021-10-29 DIAGNOSIS — M25.511 ACUTE PAIN OF BOTH SHOULDERS: ICD-10-CM

## 2021-10-29 DIAGNOSIS — Z12.4 CERVICAL CANCER SCREENING: ICD-10-CM

## 2021-10-29 LAB
ALBUMIN SERPL-MCNC: 4.1 G/DL (ref 3.5–5)
ALBUMIN/GLOB SERPL: 1.3 {RATIO} (ref 1.1–2.2)
ALP SERPL-CCNC: 53 U/L (ref 45–117)
ALT SERPL-CCNC: 26 U/L (ref 12–78)
ANION GAP SERPL CALC-SCNC: 3 MMOL/L (ref 5–15)
AST SERPL-CCNC: 14 U/L (ref 15–37)
BILIRUB SERPL-MCNC: 0.3 MG/DL (ref 0.2–1)
BUN SERPL-MCNC: 19 MG/DL (ref 6–20)
BUN/CREAT SERPL: 26 (ref 12–20)
CALCIUM SERPL-MCNC: 9.5 MG/DL (ref 8.5–10.1)
CHLORIDE SERPL-SCNC: 110 MMOL/L (ref 97–108)
CHOLEST SERPL-MCNC: 191 MG/DL
CO2 SERPL-SCNC: 28 MMOL/L (ref 21–32)
CREAT SERPL-MCNC: 0.72 MG/DL (ref 0.55–1.02)
CRP SERPL HS-MCNC: 7.8 MG/L
ERYTHROCYTE [DISTWIDTH] IN BLOOD BY AUTOMATED COUNT: 15.6 % (ref 11.5–14.5)
GLOBULIN SER CALC-MCNC: 3.2 G/DL (ref 2–4)
GLUCOSE SERPL-MCNC: 94 MG/DL (ref 65–100)
HCT VFR BLD AUTO: 42.7 % (ref 35–47)
HDLC SERPL-MCNC: 68 MG/DL
HDLC SERPL: 2.8 {RATIO} (ref 0–5)
HGB BLD-MCNC: 13.6 G/DL (ref 11.5–16)
LDLC SERPL CALC-MCNC: 111.2 MG/DL (ref 0–100)
MCH RBC QN AUTO: 27.8 PG (ref 26–34)
MCHC RBC AUTO-ENTMCNC: 31.9 G/DL (ref 30–36.5)
MCV RBC AUTO: 87.3 FL (ref 80–99)
NRBC # BLD: 0 K/UL (ref 0–0.01)
NRBC BLD-RTO: 0 PER 100 WBC
PLATELET # BLD AUTO: 217 K/UL (ref 150–400)
PMV BLD AUTO: 12.2 FL (ref 8.9–12.9)
POTASSIUM SERPL-SCNC: 4.2 MMOL/L (ref 3.5–5.1)
PROT SERPL-MCNC: 7.3 G/DL (ref 6.4–8.2)
RBC # BLD AUTO: 4.89 M/UL (ref 3.8–5.2)
SODIUM SERPL-SCNC: 141 MMOL/L (ref 136–145)
TRIGL SERPL-MCNC: 59 MG/DL (ref ?–150)
URATE SERPL-MCNC: 3.3 MG/DL (ref 2.6–6)
VLDLC SERPL CALC-MCNC: 11.8 MG/DL
WBC # BLD AUTO: 4.2 K/UL (ref 3.6–11)

## 2021-10-29 PROCEDURE — 99214 OFFICE O/P EST MOD 30 MIN: CPT | Performed by: FAMILY MEDICINE

## 2021-10-29 PROCEDURE — 99396 PREV VISIT EST AGE 40-64: CPT | Performed by: FAMILY MEDICINE

## 2021-10-29 NOTE — PATIENT INSTRUCTIONS
DASH Diet: Care Instructions  Your Care Instructions     The DASH diet is an eating plan that can help lower your blood pressure. DASH stands for Dietary Approaches to Stop Hypertension. Hypertension is high blood pressure. The DASH diet focuses on eating foods that are high in calcium, potassium, and magnesium. These nutrients can lower blood pressure. The foods that are highest in these nutrients are fruits, vegetables, low-fat dairy products, nuts, seeds, and legumes. But taking calcium, potassium, and magnesium supplements instead of eating foods that are high in those nutrients does not have the same effect. The DASH diet also includes whole grains, fish, and poultry. The DASH diet is one of several lifestyle changes your doctor may recommend to lower your high blood pressure. Your doctor may also want you to decrease the amount of sodium in your diet. Lowering sodium while following the DASH diet can lower blood pressure even further than just the DASH diet alone. Follow-up care is a key part of your treatment and safety. Be sure to make and go to all appointments, and call your doctor if you are having problems. It's also a good idea to know your test results and keep a list of the medicines you take. How can you care for yourself at home? Following the DASH diet  · Eat 4 to 5 servings of fruit each day. A serving is 1 medium-sized piece of fruit, ½ cup chopped or canned fruit, 1/4 cup dried fruit, or 4 ounces (½ cup) of fruit juice. Choose fruit more often than fruit juice. · Eat 4 to 5 servings of vegetables each day. A serving is 1 cup of lettuce or raw leafy vegetables, ½ cup of chopped or cooked vegetables, or 4 ounces (½ cup) of vegetable juice. Choose vegetables more often than vegetable juice. · Get 2 to 3 servings of low-fat and fat-free dairy each day. A serving is 8 ounces of milk, 1 cup of yogurt, or 1 ½ ounces of cheese. · Eat 6 to 8 servings of grains each day.  A serving is 1 slice of bread, 1 ounce of dry cereal, or ½ cup of cooked rice, pasta, or cooked cereal. Try to choose whole-grain products as much as possible. · Limit lean meat, poultry, and fish to 2 servings each day. A serving is 3 ounces, about the size of a deck of cards. · Eat 4 to 5 servings of nuts, seeds, and legumes (cooked dried beans, lentils, and split peas) each week. A serving is 1/3 cup of nuts, 2 tablespoons of seeds, or ½ cup of cooked beans or peas. · Limit fats and oils to 2 to 3 servings each day. A serving is 1 teaspoon of vegetable oil or 2 tablespoons of salad dressing. · Limit sweets and added sugars to 5 servings or less a week. A serving is 1 tablespoon jelly or jam, ½ cup sorbet, or 1 cup of lemonade. · Eat less than 2,300 milligrams (mg) of sodium a day. If you limit your sodium to 1,500 mg a day, you can lower your blood pressure even more. · Be aware that all of these are the suggested number of servings for people who eat 1,800 to 2,000 calories a day. Your recommended number of servings may be different if you need more or fewer calories. Tips for success  · Start small. Do not try to make dramatic changes to your diet all at once. You might feel that you are missing out on your favorite foods and then be more likely to not follow the plan. Make small changes, and stick with them. Once those changes become habit, add a few more changes. · Try some of the following:  ? Make it a goal to eat a fruit or vegetable at every meal and at snacks. This will make it easy to get the recommended amount of fruits and vegetables each day. ? Try yogurt topped with fruit and nuts for a snack or healthy dessert. ? Add lettuce, tomato, cucumber, and onion to sandwiches. ? Combine a ready-made pizza crust with low-fat mozzarella cheese and lots of vegetable toppings. Try using tomatoes, squash, spinach, broccoli, carrots, cauliflower, and onions. ?  Have a variety of cut-up vegetables with a low-fat dip as an appetizer instead of chips and dip. ? Sprinkle sunflower seeds or chopped almonds over salads. Or try adding chopped walnuts or almonds to cooked vegetables. ? Try some vegetarian meals using beans and peas. Add garbanzo or kidney beans to salads. Make burritos and tacos with mashed silverio beans or black beans. Where can you learn more? Go to http://www.patel.com/  Enter H967 in the search box to learn more about \"DASH Diet: Care Instructions. \"  Current as of: April 29, 2021               Content Version: 13.0  © 5493-3963 [employer]. Care instructions adapted under license by [employer] (which disclaims liability or warranty for this information). If you have questions about a medical condition or this instruction, always ask your healthcare professional. [address] any warranty or liability for your use of this information.

## 2021-10-29 NOTE — PROGRESS NOTES
Chief Complaint   Patient presents with    Joint Pain     all joint pain x 1 month      she is a 63y.o. year old female who presents for evaluation of joint pain. Pain Assessment Encounter      Remonia Meigs  10/29/2021  Onset of Symptoms: 1 month  ________________________________________________________________________  Description:pt denies any specific injury, pt states she works a lot. Frequency: more than 5 times a day  Pain Scale:(1-10): 8  Trauma Hx: none  Hx of similar symptoms: No  Radiation: YES, all over, all joints   Duration:  continuous      Progression: has worsened  What makes it better?: OTC meds and hemp cream   What makes it worse?:cold and working  Medications tried: aleve and diclofenac     Reviewed and agree with Nurse Note and duplicated in this note. Reviewed PmHx, RxHx, FmHx, SocHx, AllgHx and updated and dated in the chart.     Family History   Problem Relation Age of Onset    Cancer Mother         stomach    Ovarian Cancer Mother     Cancer Father     Prostate Cancer Father     Lung Cancer Sister        Past Medical History:   Diagnosis Date    HIV [facility] virus infection) ([facility] [address]     Hx of colonoscopy with polypectomy Fall 2016    follow up in 3-5 years      Social History     Socioeconomic History    Marital status: SINGLE     Spouse name: Not on file    Number of children: Not on file    Years of education: Not on file    Highest education level: Not on file   Tobacco Use    Smoking status: Current Every Day Smoker     Packs/day: 1.00    Smokeless tobacco: Never Used   Substance and Sexual Activity    Alcohol use: Yes     Comment: occasionally    Drug use: No    Sexual activity: Yes     Partners: Male     Birth control/protection: Surgical     Social Determinants of Health     Financial Resource Strain:     Difficulty of Paying Living Expenses:    Food Insecurity:     Worried About Running Out of Food in the Last Year:     [employer] in the Last Year:    Transportation Needs:     Lack of Transportation (Medical):  Lack of Transportation (Non-Medical):    Physical Activity:     Days of Exercise per Week:     Minutes of Exercise per Session:    Stress:     Feeling of Stress :    Social Connections:     Frequency of Communication with Friends and Family:     Frequency of Social Gatherings with Friends and Family:     Attends Mu-ism Services:     Active Member of Clubs or Organizations:     Attends Club or Organization Meetings:     Marital Status:         Review of Systems - negative except as listed above      Objective:     Vitals:    10/29/21 1053   Weight: 71 kg (156 lb 9.6 oz)   Height: 5' 6\" (1.676 m)       Physical Examination: General appearance - alert, well appearing, and in no distress  Back exam - tenderness noted left low back\  , negative straight-leg raise bilaterally , normal reflexes and strength bilateral lower extremities, sensory exam intact bilateral lower extremities  Neurological - alert, oriented, normal speech, no focal findings or movement disorder noted  Musculoskeletal - The bilateral shoulder is  normal to inspection. The patient has diminished range with pain  . The shoulderis tender to palpation laterally. Bicep tendon: non-tender  The NEER test is positive. The Rowan test:is negative   The Cross over test:is  negative. The Empty Can test:is  negative. Stressed ext rotation is:  negative. Stressed int rotation: negative. The Apprehension Sign is negative. The Lift off test is:  negative   Examination reveals the Painful Arch:  negative. The Labral Test is:  negative. The Sulcus Sign is:  negative. Extremities - peripheral pulses normal, no pedal edema, no clubbing or cyanosis  Skin - normal coloration and turgor, no rashes, no suspicious skin lesions noted     Assessment/ Plan:   Diagnoses and all orders for this visit:    1.  Well woman exam (no gynecological exam)  - LIPID PANEL; Future  -     METABOLIC PANEL, COMPREHENSIVE; Future  -     CBC W/O DIFF; Future    2. Acute pain of both shoulders  -     XR SHOULDER RT AP/LAT MIN 2 V; Future  -     XR SHOULDER LT AP/LAT MIN 2 V; Future    3. Acute left-sided low back pain with left-sided sciatica  -     XR SPINE LUMB 2 OR 3 V; Future    4. Multiple joint pain  -     REFERRAL TO RHEUMATOLOGY  -     CRP, HIGH SENSITIVITY; Future  -     RHEUMASSURE; Future  -     URIC ACID; Future    5. Encounter for screening mammogram for malignant neoplasm of breast  -     DB MAMMO BI SCREENING INCL CAD; Future    6. Cervical cancer screening  -     REFERRAL TO OBSTETRICS AND GYNECOLOGY    7. Colon cancer screening  -     REFERRAL TO GASTROENTEROLOGY    8. Elevated BP without diagnosis of hypertension         Pathophysiology, recovery and rehabilitation process discussed and questions answered   Counseling for 30 Minutes of the total visit duration   Pictures and figures used as necessary   Provided reassurance   Monitor response to Physical Therapy   Recommend activity modification   Recommend  lower impact activities-walking, Eliptical, Nordic Track, cycling or swimming              1) Remember to stay active and/or exercise regularly (I suggest 30-45 minutes daily)   2) For reliable dietary information, go to www. EATRIGHT.org. You may wish to consider seeing the nutritionist at [facility] [phone], also consider the [address] I have discussed the diagnosis with the patient and the intended plan as seen in the above orders. The patient has received an after-visit summary and questions were answered concerning future plans.      Medication Side Effects and Warnings were discussed with patient,  Patient Labs were reviewed and or requested, and  Patient Past Records were reviewed and or requested  yes    Chief Complaint   Patient presents with    Joint Pain     all joint pain x 1 month      she is a 63y.o. year old female who presents for CPE  Complete Physical Exam Questions:      1. Do you follow a low fat diet?  no  2. Are you up to date on your Tdap (<10 years)? Yes  3. Have you ever had a Pneumovax vaccine (>65)? Unknown   PCV13 Unknown   PPSV23 Unknown  4. Have you had Zoster vaccine (>60)? No  5. Have you had the HPV - Gardasil (13- 26)? No  6. Do you follow an exercise program?  yes  7. Do you smoke?  yes  8. Do you consider yourself overweight?  no  9. Is there a family history of CAD< age 48? No  10. Is there a family history of Cancer?  no  11. Do you know your Cancer risks? No  12. Have you had a colonoscopy?  no  13. Have you been tested for HIV or other STI's? No HIV testing today(18-66 y/o)? Unknown  14. Have had a bone density scan or DEXA done(>65)? No  15. Have you had an EKG performed in the last five years (>50)? No    Other complaints:    Reviewed and agree with Nurse Note and duplicated in this note. Reviewed PmHx, RxHx, FmHx, SocHx, AllgHx and updated and dated in the chart.     Family History   Problem Relation Age of Onset    Cancer Mother         stomach    Ovarian Cancer Mother     Cancer Father     Prostate Cancer Father     Lung Cancer Sister        Past Medical History:   Diagnosis Date    HIV [facility]human immunodeficiency virus infection) ([facility] [address]     Hx of colonoscopy with polypectomy Fall 2016    follow up in 3-5 years      Social History     Socioeconomic History    Marital status: SINGLE     Spouse name: Not on file    Number of children: Not on file    Years of education: Not on file    Highest education level: Not on file   Tobacco Use    Smoking status: Current Every Day Smoker     Packs/day: 1.00    Smokeless tobacco: Never Used   Substance and Sexual Activity    Alcohol use: Yes     Comment: occasionally    Drug use: No    Sexual activity: Yes     Partners: Male     Birth control/protection: Surgical     Social Determinants of Health     Financial Resource Strain:     Difficulty of Paying Living Expenses:    Food Insecurity:     Worried About Running Out of Food in the Last Year:     [address] in the Last Year:    Transportation Needs:     Lack of Transportation (Medical):  Lack of Transportation (Non-Medical):    Physical Activity:     Days of Exercise per Week:     Minutes of Exercise per Session:    Stress:     Feeling of Stress :    Social Connections:     Frequency of Communication with Friends and Family:     Frequency of Social Gatherings with Friends and Family:     Attends Mu-ism Services:     Active Member of Clubs or Organizations:     Attends Club or Organization Meetings:     Marital Status:         Review of Systems - negative except as listed above      Objective:     Vitals:    10/29/21 1053   BP: (!) 149/81   Pulse: 77   SpO2: 98%   Weight: 156 lb 9.6 oz (71 kg)   Height: 5' 6\" (1.676 m)       Physical Examination: General appearance - alert, well appearing, and in no distress  Eyes - pupils equal and reactive, extraocular eye movements intact  Ears - bilateral TM's and external ear canals normal  Nose - normal and patent, no erythema, discharge or polyps  Mouth - mucous membranes moist, pharynx normal without lesions  Neck - supple, no significant adenopathy  Chest - clear to auscultation, no wheezes, rales or rhonchi, symmetric air entry  Heart - normal rate, regular rhythm, normal S1, S2, no murmurs, rubs, clicks or gallops  Abdomen - soft, nontender, nondistended, no masses or organomegaly  Neurological - alert, oriented, normal speech, no focal findings or movement disorder noted  Musculoskeletal - no joint tenderness, deformity or swelling  Extremities - peripheral pulses normal, no pedal edema, no clubbing or cyanosis  Skin - normal coloration and turgor, no rashes, no suspicious skin lesions noted      Assessment/ Plan:   Diagnoses and all orders for this visit:    1.  Well woman exam (no gynecological exam)  -     LIPID PANEL; Future  -     METABOLIC PANEL, COMPREHENSIVE; Future  -     CBC W/O DIFF; Future    2. Acute pain of both shoulders  -     XR SHOULDER RT AP/LAT MIN 2 V; Future  -     XR SHOULDER LT AP/LAT MIN 2 V; Future  -     REFERRAL TO PHYSICAL THERAPY    3. Acute left-sided low back pain with left-sided sciatica  -     XR SPINE LUMB 2 OR 3 V; Future  -     REFERRAL TO PHYSICAL THERAPY    4. Multiple joint pain  -     REFERRAL TO RHEUMATOLOGY  -     CRP, HIGH SENSITIVITY; Future  -     RHEUMASSURE; Future  -     URIC ACID; Future  -     REFERRAL TO PHYSICAL THERAPY    5. Encounter for screening mammogram for malignant neoplasm of breast  -     DB MAMMO BI SCREENING INCL CAD; Future    6. Cervical cancer screening  -     REFERRAL TO OBSTETRICS AND GYNECOLOGY    7. Colon cancer screening  -     REFERRAL TO GASTROENTEROLOGY    8. Elevated BP without diagnosis of hypertension    DASH diet recommendations given to patient, patient will return to clinic in 4 weeks for blood pressure recheck      Labs to be drawn: CBC, CMP, Lipid            I have discussed the diagnosis with the patient and the intended plan as seen in the above orders. The patient has received an after-visit summary and questions were answered concerning future plans. Medication Side Effects and Warnings were discussed with patient,  Patient Labs were reviewed and or requested, and  Patient Past Records were reviewed and or requested  yes         Pt agrees to call or return to clinic and/or go to closest ER with any worsening of symptoms. This may include, but not limited to increased fever (>100.4) with NSAIDS or Tylenol, increased edema, confusion, rash, worsening of presenting symptoms. Please note that this dictation was completed with [employer], the computer voice recognition software. Quite often unanticipated grammatical, syntax, homophones, and other interpretive errors are inadvertently transcribed by the computer software.   Please disregard these errors. Please excuse any errors that have escaped final proofreading. Thank you.

## 2021-11-03 ENCOUNTER — TELEPHONE (OUTPATIENT)
Dept: RHEUMATOLOGY | Age: 56
End: 2021-11-03

## 2021-11-03 NOTE — TELEPHONE ENCOUNTER
----- Message from Oliva Avalos sent at 11/3/2021 12:42 PM EDT -----  Regarding: /telephone  General Message/Vendor Calls    Caller's first and last name:self      Reason for call:n/p appt       Callback required yes/no and why: yes      Best contact number(s):[phone]      Details to clarify the request:Pt will need to be seen for joint pain. Please give pt a call back so that they can schedule their appt.        Oliva Avalos

## 2021-11-03 NOTE — TELEPHONE ENCOUNTER
Call patient advise physician new patient appt are out until the middle of January 2022 patient stated she could not wait that long and would contact the referring provider's office to let him know. sdh

## 2021-11-03 NOTE — TELEPHONE ENCOUNTER
----- Message from Isak Vega sent at 11/3/2021  2:26 PM EDT -----  Regarding: [facility]PIT/MD/DTELEPHONE  Contact: [phone]  Patient return call    Caller's first and last name and relationship (if not the patient): N/A      Best contact number(s): [phone]      Whose call is being returned: Unknown      Details to clarify the request: Patient returning a missed call concerning scheduling a new pt appt. Per a detailed message can be left with date and time.       Isak Vega

## 2021-11-07 LAB
14.3.3 ETA, RHEUM. ARTHRITIS: <0.2 NG/ML
CCP IGA+IGG SERPL IA-ACNC: <20 UNITS
RHEUMATOID FACT SERPL-ACNC: 23 UNITS/ML

## 2021-11-13 ENCOUNTER — HOSPITAL ENCOUNTER (EMERGENCY)
Age: 56
Discharge: HOME | End: 2021-11-13
Attending: STUDENT IN AN ORGANIZED HEALTH CARE EDUCATION/TRAINING PROGRAM | Admitting: STUDENT IN AN ORGANIZED HEALTH CARE EDUCATION/TRAINING PROGRAM
Payer: COMMERCIAL

## 2021-11-13 VITALS
WEIGHT: 146.39 LBS | RESPIRATION RATE: 20 BRPM | HEART RATE: 80 BPM | TEMPERATURE: 98.9 F | OXYGEN SATURATION: 100 % | SYSTOLIC BLOOD PRESSURE: 143 MMHG | HEIGHT: 66 IN | DIASTOLIC BLOOD PRESSURE: 98 MMHG | BODY MASS INDEX: 23.53 KG/M2

## 2021-11-13 DIAGNOSIS — E86.0 DEHYDRATION: ICD-10-CM

## 2021-11-13 DIAGNOSIS — R42 EPISODIC LIGHTHEADEDNESS: Primary | ICD-10-CM

## 2021-11-13 DIAGNOSIS — F41.1 ANXIETY STATE: ICD-10-CM

## 2021-11-13 LAB
ALBUMIN SERPL-MCNC: 4.2 G/DL (ref 3.5–5)
ALBUMIN/GLOB SERPL: 1.1 {RATIO} (ref 1.1–2.2)
ALP SERPL-CCNC: 54 U/L (ref 45–117)
ALT SERPL-CCNC: 27 U/L (ref 12–78)
ANION GAP SERPL CALC-SCNC: 8 MMOL/L (ref 5–15)
APPEARANCE UR: CLEAR
AST SERPL-CCNC: 20 U/L (ref 15–37)
BACTERIA URNS QL MICRO: NEGATIVE /HPF
BASOPHILS # BLD: 0 K/UL (ref 0–0.1)
BASOPHILS NFR BLD: 0 % (ref 0–1)
BILIRUB SERPL-MCNC: 0.5 MG/DL (ref 0.2–1)
BILIRUB UR QL CFM: NEGATIVE
BUN SERPL-MCNC: 18 MG/DL (ref 6–20)
BUN/CREAT SERPL: 22 (ref 12–20)
CALCIUM SERPL-MCNC: 9.3 MG/DL (ref 8.5–10.1)
CHLORIDE SERPL-SCNC: 102 MMOL/L (ref 97–108)
CO2 SERPL-SCNC: 27 MMOL/L (ref 21–32)
COLOR UR: ABNORMAL
CREAT SERPL-MCNC: 0.82 MG/DL (ref 0.55–1.02)
DIFFERENTIAL METHOD BLD: ABNORMAL
EOSINOPHIL # BLD: 0 K/UL (ref 0–0.4)
EOSINOPHIL NFR BLD: 0 % (ref 0–7)
EPITH CASTS URNS QL MICRO: ABNORMAL /LPF
ERYTHROCYTE [DISTWIDTH] IN BLOOD BY AUTOMATED COUNT: 14.7 % (ref 11.5–14.5)
GLOBULIN SER CALC-MCNC: 4 G/DL (ref 2–4)
GLUCOSE SERPL-MCNC: 165 MG/DL (ref 65–100)
GLUCOSE UR STRIP.AUTO-MCNC: NEGATIVE MG/DL
HCT VFR BLD AUTO: 44.2 % (ref 35–47)
HGB BLD-MCNC: 14.8 G/DL (ref 11.5–16)
HGB UR QL STRIP: NEGATIVE
IMM GRANULOCYTES # BLD AUTO: 0 K/UL (ref 0–0.04)
IMM GRANULOCYTES NFR BLD AUTO: 0 % (ref 0–0.5)
KETONES UR QL STRIP.AUTO: 80 MG/DL
LEUKOCYTE ESTERASE UR QL STRIP.AUTO: NEGATIVE
LYMPHOCYTES # BLD: 2.9 K/UL (ref 0.8–3.5)
LYMPHOCYTES NFR BLD: 53 % (ref 12–49)
MCH RBC QN AUTO: 28.1 PG (ref 26–34)
MCHC RBC AUTO-ENTMCNC: 33.5 G/DL (ref 30–36.5)
MCV RBC AUTO: 84 FL (ref 80–99)
MONOCYTES # BLD: 0.4 K/UL (ref 0–1)
MONOCYTES NFR BLD: 7 % (ref 5–13)
MUCOUS THREADS URNS QL MICRO: ABNORMAL /LPF
NEUTS SEG # BLD: 2.2 K/UL (ref 1.8–8)
NEUTS SEG NFR BLD: 40 % (ref 32–75)
NITRITE UR QL STRIP.AUTO: NEGATIVE
NRBC # BLD: 0 K/UL (ref 0–0.01)
NRBC BLD-RTO: 0 PER 100 WBC
PH UR STRIP: 5.5 [PH] (ref 5–8)
PLATELET # BLD AUTO: 233 K/UL (ref 150–400)
PMV BLD AUTO: 11.5 FL (ref 8.9–12.9)
POTASSIUM SERPL-SCNC: 3.4 MMOL/L (ref 3.5–5.1)
PROT SERPL-MCNC: 8.2 G/DL (ref 6.4–8.2)
PROT UR STRIP-MCNC: 30 MG/DL
RBC # BLD AUTO: 5.26 M/UL (ref 3.8–5.2)
RBC #/AREA URNS HPF: ABNORMAL /HPF (ref 0–5)
SODIUM SERPL-SCNC: 137 MMOL/L (ref 136–145)
SP GR UR REFRACTOMETRY: 1.03 (ref 1–1.03)
TROPONIN-HIGH SENSITIVITY: 7 NG/L (ref 0–51)
UA: UC IF INDICATED,UAUC: ABNORMAL
UROBILINOGEN UR QL STRIP.AUTO: 1 EU/DL (ref 0.2–1)
WBC # BLD AUTO: 5.6 K/UL (ref 3.6–11)
WBC URNS QL MICRO: ABNORMAL /HPF (ref 0–4)

## 2021-11-13 PROCEDURE — 85025 COMPLETE CBC W/AUTO DIFF WBC: CPT

## 2021-11-13 PROCEDURE — 36415 COLL VENOUS BLD VENIPUNCTURE: CPT

## 2021-11-13 PROCEDURE — 84484 ASSAY OF TROPONIN QUANT: CPT

## 2021-11-13 PROCEDURE — 74011250637 HC RX REV CODE- 250/637: Performed by: STUDENT IN AN ORGANIZED HEALTH CARE EDUCATION/TRAINING PROGRAM

## 2021-11-13 PROCEDURE — 80053 COMPREHEN METABOLIC PANEL: CPT

## 2021-11-13 PROCEDURE — 81001 URINALYSIS AUTO W/SCOPE: CPT

## 2021-11-13 PROCEDURE — 93005 ELECTROCARDIOGRAM TRACING: CPT

## 2021-11-13 PROCEDURE — 99284 EMERGENCY DEPT VISIT MOD MDM: CPT

## 2021-11-13 RX ORDER — HYDROXYZINE 50 MG/1
50 TABLET, FILM COATED ORAL
Qty: 20 TABLET | Refills: 0 | Status: SHIPPED | OUTPATIENT
Start: 2021-11-13 | End: 2021-11-23

## 2021-11-13 RX ORDER — POTASSIUM CHLORIDE 750 MG/1
10 TABLET, FILM COATED, EXTENDED RELEASE ORAL
Status: COMPLETED | OUTPATIENT
Start: 2021-11-13 | End: 2021-11-13

## 2021-11-13 RX ADMIN — POTASSIUM CHLORIDE 10 MEQ: 750 TABLET, FILM COATED, EXTENDED RELEASE ORAL at 19:41

## 2021-11-14 LAB
ATRIAL RATE: 93 BPM
CALCULATED P AXIS, ECG09: 76 DEGREES
CALCULATED R AXIS, ECG10: 5 DEGREES
CALCULATED T AXIS, ECG11: 66 DEGREES
DIAGNOSIS, 93000: NORMAL
P-R INTERVAL, ECG05: 146 MS
Q-T INTERVAL, ECG07: 366 MS
QRS DURATION, ECG06: 84 MS
QTC CALCULATION (BEZET), ECG08: 455 MS
VENTRICULAR RATE, ECG03: 93 BPM

## 2021-11-14 NOTE — ED PROVIDER NOTES
EMERGENCY DEPARTMENT HISTORY AND PHYSICAL EXAM      Date: 11/13/2021  Patient Name: Mik Gutierrez    History of Presenting Illness     Chief Complaint   Patient presents with    Dizziness     for a week feels like it is stress she is under a lot of stress; feeling weak. no vomiting. decrease appetite. History Provided By: Patient    HPI: Mik Gutierrez, 63 y.o. female with past medical history of HIV (viral load reportedly undetectable x8 years, compliant with antiretroviral therapy), presents to the ED with cc of lightheadedness and anxiety x several days. Patient reports she has been under a lot of stress recently. States that her sister is very ill currently from new diagnosis of cancer. Reports that her son who is also HIV positive, is not doing very well healthwise, and has noncompliant with all of his HIV treatments. She reports due to the stress of caring for her loved ones, she has not been taking care of herself. States that she has been feeling more lightheaded than usual, which she describes as a near fainting sensation. Denies actual syncope. Denies associated chest pain, shortness of breath, palpitations, CARVER, nausea, diaphoresis, headache, lower extremity edema or calf tenderness. She states that she has not been eating or drinking very much over the past week. She thinks that she may be dehydrated. Patient wonders if her lightheadedness could be secondary to stress and anxiety. Patient has otherwise not been ill. She denies any fevers or chills. PCP: Herberth Roberts MD    No current facility-administered medications on file prior to encounter. Current Outpatient Medications on File Prior to Encounter   Medication Sig Dispense Refill    methocarbamoL (Robaxin-750) 750 mg tablet Take 1 Tab by mouth four (4) times daily.  20 Tab 0    ergocalciferol (ERGOCALCIFEROL) 50,000 unit capsule       elvitegravir-cobicistat-emtricitabine-tenofovir alafenamide (GENVOYA) tab tablet Take 1 Tab by mouth daily (with breakfast).  zolpidem (AMBIEN) 5 mg tablet Take 1 Tab by mouth nightly as needed for Sleep. Max Daily Amount: 5 mg. Indications: SLEEP-ONSET INSOMNIA 30 Tab 2       Past History     Past Medical History:  Past Medical History:   Diagnosis Date    [facility]human immunodeficiency virus infection) ([facility] [address]     Hx of colonoscopy with polypectomy Fall 2016    follow up in 3-5 years       Past Surgical History:  Past Surgical History:   Procedure Laterality Date    HX HYSTERECTOMY  2007    partial, ovaries intact       Family History:  Family History   Problem Relation Age of Onset    Cancer Mother         stomach    Ovarian Cancer Mother     Cancer Father     Prostate Cancer Father     Lung Cancer Sister        Social History:  Social History     Tobacco Use    Smoking status: Current Every Day Smoker     Packs/day: 1.00    Smokeless tobacco: Never Used   Substance Use Topics    Alcohol use: Yes     Comment: occasionally    Drug use: No       Allergies: Allergies   Allergen Reactions    Percocet [Oxycodone-Acetaminophen] Itching         Review of Systems   Review of Systems   Constitutional: Negative for chills and fever. HENT: Negative for congestion and rhinorrhea. Eyes: Negative for visual disturbance. Respiratory: Negative for chest tightness and shortness of breath. Cardiovascular: Negative for chest pain, palpitations and leg swelling. Gastrointestinal: Negative for abdominal pain, diarrhea, nausea and vomiting. Genitourinary: Negative for dysuria, flank pain and hematuria. Musculoskeletal: Negative for back pain and neck pain. Skin: Negative for rash. Allergic/Immunologic: Negative for immunocompromised state. Neurological: Positive for light-headedness. Negative for dizziness, speech difficulty, weakness and headaches. Hematological: Negative for adenopathy. Psychiatric/Behavioral: Negative for dysphoric mood and suicidal ideas.  The patient is nervous/anxious. Physical Exam   Physical Exam  Vitals and nursing note reviewed. Constitutional:       General: She is not in acute distress. Appearance: She is not ill-appearing or toxic-appearing. HENT:      Head: Normocephalic and atraumatic. Nose: Nose normal.      Mouth/Throat:      Mouth: Mucous membranes are moist.   Eyes:      Extraocular Movements: Extraocular movements intact. Pupils: Pupils are equal, round, and reactive to light. Cardiovascular:      Rate and Rhythm: Normal rate and regular rhythm. Pulses: Normal pulses. Pulmonary:      Effort: Pulmonary effort is normal.      Breath sounds: No stridor. No wheezing or rhonchi. Abdominal:      General: Abdomen is flat. Bowel sounds are normal. There is no distension. Palpations: Abdomen is soft. Tenderness: There is no abdominal tenderness. Musculoskeletal:         General: Normal range of motion. Cervical back: Normal range of motion and neck supple. Skin:     General: Skin is warm and dry. Neurological:      General: No focal deficit present. Mental Status: She is alert and oriented to person, place, and time. Psychiatric:         Mood and Affect: Mood is anxious.          Judgment: Judgment normal.         Diagnostic Study Results     Labs -     Recent Results (from the past 24 hour(s))   EKG, 12 LEAD, INITIAL    Collection Time: 11/13/21  6:02 PM   Result Value Ref Range    Ventricular Rate 93 BPM    Atrial Rate 93 BPM    P-R Interval 146 ms    QRS Duration 84 ms    Q-T Interval 366 ms    QTC Calculation (Bezet) 455 ms    Calculated P Axis 76 degrees    Calculated R Axis 5 degrees    Calculated T Axis 66 degrees    Diagnosis       Normal sinus rhythm  Biatrial enlargement  When compared with ECG of 21-JUN-2020 18:01,  No significant change was found     CBC WITH AUTOMATED DIFF    Collection Time: 11/13/21  6:07 PM   Result Value Ref Range    WBC 5.6 3.6 - 11.0 K/uL    RBC 5.26 (H) 3.80 - 5.20 M/uL    HGB 14.8 11.5 - 16.0 g/dL    HCT 44.2 35.0 - 47.0 %    MCV 84.0 80.0 - 99.0 FL    MCH 28.1 26.0 - 34.0 PG    MCHC 33.5 30.0 - 36.5 g/dL    RDW 14.7 (H) 11.5 - 14.5 %    PLATELET 846 189 - 077 K/uL    MPV 11.5 8.9 - 12.9 FL    NRBC 0.0 0  WBC    ABSOLUTE NRBC 0.00 0.00 - 0.01 K/uL    NEUTROPHILS 40 32 - 75 %    LYMPHOCYTES 53 (H) 12 - 49 %    MONOCYTES 7 5 - 13 %    EOSINOPHILS 0 0 - 7 %    BASOPHILS 0 0 - 1 %    IMMATURE GRANULOCYTES 0 0.0 - 0.5 %    ABS. NEUTROPHILS 2.2 1.8 - 8.0 K/UL    ABS. LYMPHOCYTES 2.9 0.8 - 3.5 K/UL    ABS. MONOCYTES 0.4 0.0 - 1.0 K/UL    ABS. EOSINOPHILS 0.0 0.0 - 0.4 K/UL    ABS. BASOPHILS 0.0 0.0 - 0.1 K/UL    ABS. IMM. GRANS. 0.0 0.00 - 0.04 K/UL    DF AUTOMATED     METABOLIC PANEL, COMPREHENSIVE    Collection Time: 11/13/21  6:07 PM   Result Value Ref Range    Sodium 137 136 - 145 mmol/L    Potassium 3.4 (L) 3.5 - 5.1 mmol/L    Chloride 102 97 - 108 mmol/L    CO2 27 21 - 32 mmol/L    Anion gap 8 5 - 15 mmol/L    Glucose 165 (H) 65 - 100 mg/dL    BUN 18 6 - 20 MG/DL    Creatinine 0.82 0.55 - 1.02 MG/DL    BUN/Creatinine ratio 22 (H) 12 - 20      GFR est AA >60 >60 ml/min/1.73m2    GFR est non-AA >60 >60 ml/min/1.73m2    Calcium 9.3 8.5 - 10.1 MG/DL    Bilirubin, total 0.5 0.2 - 1.0 MG/DL    ALT (SGPT) 27 12 - 78 U/L    AST (SGOT) 20 15 - 37 U/L    Alk.  phosphatase 54 45 - 117 U/L    Protein, total 8.2 6.4 - 8.2 g/dL    Albumin 4.2 3.5 - 5.0 g/dL    Globulin 4.0 2.0 - 4.0 g/dL    A-G Ratio 1.1 1.1 - 2.2     TROPONIN-HIGH SENSITIVITY    Collection Time: 11/13/21  6:07 PM   Result Value Ref Range    Troponin-High Sensitivity 7 0 - 51 ng/L   URINALYSIS W/ REFLEX CULTURE    Collection Time: 11/13/21  7:42 PM    Specimen: Miscellaneous sample; Urine    Urine specimen   Result Value Ref Range    Color DARK YELLOW      Appearance CLEAR CLEAR      Specific gravity 1.029 1.003 - 1.030      pH (UA) 5.5 5.0 - 8.0      Protein 30 (A) NEG mg/dL    Glucose Negative NEG mg/dL Ketone 80 (A) NEG mg/dL    Blood Negative NEG      Urobilinogen 1.0 0.2 - 1.0 EU/dL    Nitrites Negative NEG      Leukocyte Esterase Negative NEG      WBC 0-4 0 - 4 /hpf    RBC 0-5 0 - 5 /hpf    Epithelial cells FEW FEW /lpf    Bacteria Negative NEG /hpf    UA:UC IF INDICATED CULTURE NOT INDICATED BY UA RESULT CNI      Mucus 2+ (A) NEG /lpf   BILIRUBIN, CONFIRM    Collection Time: 11/13/21  7:42 PM   Result Value Ref Range    Bilirubin UA, confirm Negative NEG         Radiologic Studies -   No orders to display     CT Results  (Last 48 hours)    None        CXR Results  (Last 48 hours)    None          Medical Decision Making   I, Tyra Cross MD am the first provider for this patient, and I am the attending of record for this patient encounter. I reviewed the vital signs, available nursing notes, past medical history, past surgical history, family history and social history. Vital Signs-Reviewed the patient's vital signs. Patient Vitals for the past 24 hrs:   Temp Pulse Resp BP SpO2   11/13/21 1937 98.9 °F (37.2 °C) 80 20 (!) 143/98 100 %   11/13/21 1930  83 21 (!) 153/100 99 %   11/13/21 1758 98.7 °F (37.1 °C) (!) 107 14 (!) 166/80 100 %       EKG interpretation: (Preliminary)  Normal sinus rhythm, 93 bpm, no acute ST changes when compared to previous EKG. QTc normal. EKG interpretation by Tyra Cross MD    Records Reviewed: Nursing Notes and Old Medical Records    Provider Notes (Medical Decision Making):   Patient initially tachycardic on arrival to the ED, however, this self resolved by the time I had gone to evaluate her. She reports that she was feeling more anxious on initial arrival to the ED, but has since calmed down slightly. She does appear slightly anxious on exam, but is otherwise very well-appearing, in no acute distress. She gets slightly tearful when discussing her current life stressors, including family members health issues.   Suspect there is a component of anxiety contributing to patient's current symptoms. Other differentials considered include cardiac arrhythmia, dehydration, orthostatic hypotension, vasovagal reaction, UTI, etc. Will check EKG, troponin, CBC, CMP, UA. Labs remarkable for mild hypokalemia of 3.4. Supplemented with 10 mEq of KCl in the ED. Renal function is normal.  However, BUN: Creatinine ratio is slightly elevated at 22. Suspect mild degree of dehydration. EKG is not concerning for acute ischemia. High-sensitivity troponin is 7. Doubt cardiac ischemia as symptoms have been present for a week, patient not having chest pain, and therefore troponin of 7 is essentially negative (less than 0.05 on traditional troponin scale). UA negative for infection, but does show 80 ketones. Again, in line with dehydration. Results of today's work-up discussed with the patient. She is encouraged to significantly increase her oral fluid intake at home as I suspect this is contributing to her lightheadedness symptoms. She has been drinking fluids since arrival in the ED. She feels comfortable with plan for discharge. All of her questions were answered. Advised to follow-up early with her PCP for repeat evaluation. Reasons to return to the ED were discussed. ED Course:   Initial assessment performed. The patient's presenting problems have been discussed, and they are in agreement with the care plan formulated and outlined with them. I have encouraged them to ask questions as they arise throughout their visit. Marjan Browne MD      Disposition:  Discharge      DISCHARGE PLAN:  1. Discharge Medication List as of 11/13/2021  8:28 PM      START taking these medications    Details   hydrOXYzine HCL (ATARAX) 50 mg tablet Take 1 Tablet by mouth every six (6) hours as needed for Anxiety for up to 10 days. , Normal, Disp-20 Tablet, R-0         CONTINUE these medications which have NOT CHANGED    Details   methocarbamoL (Robaxin-750) 750 mg tablet Take 1 Tab by mouth four (4) times daily. , Normal, Disp-20 Tab, R-0      ergocalciferol (ERGOCALCIFEROL) 50,000 unit capsule Historical Med      elvitegravir-cobicistat-emtricitabine-tenofovir alafenamide (GENVOYA) tab tablet Take 1 Tab by mouth daily (with breakfast). , Historical Med      zolpidem (AMBIEN) 5 mg tablet Take 1 Tab by mouth nightly as needed for Sleep. Max Daily Amount: 5 mg. Indications: SLEEP-ONSET INSOMNIA, Print, Disp-30 Tab, R-2           2. Follow-up Information     Follow up With Specialties Details [address]  [address]    Skippy Balls, [address], Sports Medicine   [address]  [phone]          3. Return to ED if worse     Diagnosis     Clinical Impression:   1. Episodic lightheadedness    2. Anxiety state    3. Dehydration        Attestations:    Gwendolyn Grey MD    Please note that this dictation was completed with [email or web address], the [employer] voice recognition software. Quite often unanticipated grammatical, syntax, homophones, and other interpretive errors are inadvertently transcribed by the computer software. Please disregard these errors. Please excuse any errors that have escaped final proofreading. Thank you.

## 2021-11-14 NOTE — DISCHARGE INSTRUCTIONS
[facility] SYSTEMS Departments     For adult and child immunizations, family planning, TB screening, STD testing and women's health services. [facility]: [place] [phone]      [facility] 25   [address]   [address]   170 [facility]: [place] [address] [facility] [address]  [phone]      [address]          [address] [place] [address] [address]     For primary care services, woman and child wellness, and some clinics providing specialty care. [facility] -- [address] 2525 [facility] [phone]   411 [facility] 200 [facility] 3614 [facility] [phone]   [address] [facility] [address]  [phone]   [address] [place] [address] 5850  Community  [phone]   7700 [facility] [address] [place] [address] [phone]   [facility] 81 [facility] [phone]   [facility] 1051 [facility] [phone]   [facility]: [facility] [address] [place] [address], [address] [phone]     [address] [phone]   [facility] Outreach 5850 [facility]  [phone]   [employer]  [address], [address] ([email or web address]) [phone]         Sexual Health/Woman Wellness Clinics    For STD/HIV testing and treatment, pregnancy testing and services, men's health, birth control services, LGBT services, and hepatitis/HPV vaccine services. [facility] for [place] [employer] 201 N. [facility] 75 [facility] 1579 [address] [phone]   [facility] [address], 5th floor [phone]   Pregnancy [address] [facility] [address] [phone]         Specialty Service 7739 [facility]   [phone]   [place]   [phone]   Women, Infant and Children's Services: [address] [phone]       600 [facility] [place] [facility]   [phone]   [address]   [facility] Psychiatry     [phone]   [address] Crisis   [address] [phone]       Local Primary Care Physicians  [facility] [phone]  Soila Huitron, MD Laine Norton MD [facility] Community Doctors [phone]  Ricardo Rosales, [facility]  Paramjit Bland, MD Stuart Lester MD [address] [place] [address] [phone]  MD Beverly Alcantar [place] [address] [facility] [phone]  Santiago Spurling, MD Ival Manges, MD Florrie Corrente, MD Necia Medicine, MD   [facility] [phone]  ADELSO SALVADOR, MD Junito Bull, NP [address] [phone]  Angelene Lennox, MD Eric Dailey, MD Nicole Schroeder, MD Kenia Mc MD Marlyse Curlin, MD Lionell Pipes, MD   33 57 [facility]  Kevin Perez MD [address] [phone]  Anjel Boyce, MD Nupur Worthington, NP  Romeo Wu, MD Vero Coleman MD Gibson Many, MD Francoise Mines, MD   5286 [facility] Practice [phone]  MD Josefina Hendricks, P  Sandrine Easley, JUNE Camacho, MD Vj Giron MD Enriqueta Barrows, MD [facility] [phone]  MD Tierra Eastman MD Eddy Churchman, MD Darlin Newman MD   [address] [phone]  MD Bill De León MD [facility] [phone]  MD Lobito Perkins MD Caleb Joseph Gayle [facility], [address] [facility] [phone]  Lachelle Rosario, MD Milton Whyte, MD Eloina Ruiz, MD Jennifer Sweeney, MD Nadiya Robert, MD Ramona Parnell, NP  Nash Khan MD 1619 [facility]   [phone]  MD Artie Borjas, MD Gaby Phipps MD   2102 [facility] [phone]  Esteban Frazier, MD Shonna Silva, HARESH Porter, MARIANA Porter, MARIANA Maradiaga, MD Ailyn Muniz, JUNE Reagan, DO Miscellaneous:  Hilaria Veras [phone]

## 2021-11-14 NOTE — ED NOTES
Bedside shift change report given to Ilene (oncoming nurse) by Chencho Scott (offgoing nurse). Report included the following information SBAR, ED Summary and Recent Results.

## 2021-11-23 ENCOUNTER — OFFICE VISIT (OUTPATIENT)
Dept: INTERNAL MEDICINE CLINIC | Age: 56
End: 2021-11-23
Payer: COMMERCIAL

## 2021-11-23 VITALS
HEIGHT: 66 IN | OXYGEN SATURATION: 100 % | WEIGHT: 144 LBS | HEART RATE: 100 BPM | TEMPERATURE: 98 F | BODY MASS INDEX: 23.14 KG/M2 | SYSTOLIC BLOOD PRESSURE: 143 MMHG | DIASTOLIC BLOOD PRESSURE: 82 MMHG | RESPIRATION RATE: 16 BRPM

## 2021-11-23 DIAGNOSIS — F41.9 ANXIETY: Primary | ICD-10-CM

## 2021-11-23 PROCEDURE — 99214 OFFICE O/P EST MOD 30 MIN: CPT | Performed by: FAMILY MEDICINE

## 2021-11-23 NOTE — PROGRESS NOTES
Chief Complaint   Patient presents with    Depression    Panic Attack     she is a 63y.o. year old female who presents for evaluation of   Anxiety and/or Depression  Well controlled on none and no other therapies. Ongoing symptoms include: palpitations, shortness of breath, dizziness, insomnia, racing thoughts, feelings of losing control decreased sleep and depressed mood  Patient denies: sweating, chest pain, suicidal ideation. Reported side effects from the treatment: none. Reviewed and agree with Nurse Note and duplicated in this note. Reviewed PmHx, RxHx, FmHx, SocHx, AllgHx and updated and dated in the chart.     Family History   Problem Relation Age of Onset    Cancer Mother         stomach    Ovarian Cancer Mother     Cancer Father     Prostate Cancer Father     Lung Cancer Sister        Past Medical History:   Diagnosis Date    HIV [facility] virus infection) ([facility] [address]     Hx of colonoscopy with polypectomy Fall 2016    follow up in 3-5 years      Social History     Socioeconomic History    Marital status: SINGLE   Tobacco Use    Smoking status: Current Every Day Smoker     Packs/day: 1.00    Smokeless tobacco: Never Used   Substance and Sexual Activity    Alcohol use: Yes     Comment: occasionally    Drug use: No    Sexual activity: Yes     Partners: Male     Birth control/protection: Surgical        Review of Systems - negative except as listed above      Objective:     Vitals:    11/23/21 1533   BP: (!) 143/82   Pulse: 100   Resp: 16   Temp: 98 °F (36.7 °C)   SpO2: 100%   Weight: 144 lb (65.3 kg)   Height: 5' 6\" (1.676 m)       Physical Examination: General appearance - alert, well appearing, and in no distress  Eyes - pupils equal and reactive, extraocular eye movements intact  Ears - bilateral TM's and external ear canals normal  Nose - normal and patent, no erythema, discharge or polyps  Mouth - mucous membranes moist, pharynx normal without lesions  Neck - supple, no significant adenopathy  Chest - clear to auscultation, no wheezes, rales or rhonchi, symmetric air entry  Heart - normal rate, regular rhythm, normal S1, S2, no murmurs, rubs, clicks or gallops  Abdomen - soft, nontender, nondistended, no masses or organomegaly  Musculoskeletal - no joint tenderness, deformity or swelling  Extremities - peripheral pulses normal, no pedal edema, no clubbing or cyanosis  Skin - normal coloration and turgor, no rashes, no suspicious skin lesions noted    Assessment/ Plan:   Diagnoses and all orders for this visit:    1. Anxiety  -     TSH 3RD GENERATION; Future  -     METABOLIC PANEL, BASIC; Future  -     REFERRAL TO PSYCHIATRY    Will start Zoloft at 25 mg, patient will increase in 2 weeks and return to clinic in 2 weeks to discuss further. Patient was also given letter for out of work for 1 week            I have discussed the diagnosis with the patient and the intended plan as seen in the above orders. The patient has received an after-visit summary and questions were answered concerning future plans. Medication Side Effects and Warnings were discussed with patient,  Patient Labs were reviewed and or requested, and  Patient Past Records were reviewed and or requested  yes         Pt agrees to call or return to clinic and/or go to closest ER with any worsening of symptoms. This may include, but not limited to increased fever (>100.4) with NSAIDS or Tylenol, increased edema, confusion, rash, worsening of presenting symptoms. Please note that this dictation was completed with [employer], the computer voice recognition software. Quite often unanticipated grammatical, syntax, homophones, and other interpretive errors are inadvertently transcribed by the computer software. Please disregard these errors. Please excuse any errors that have escaped final proofreading. Thank you.

## 2021-11-23 NOTE — LETTER
NOTIFICATION RETURN TO WORK / SCHOOL    11/23/2021 3:50 PM    Ms. [address]ros [phone]      To Whom It May Concern:    Rowe Habermann is currently under the care of Susana Naranjo. She will return to work/school on: 11/30/21    If there are questions or concerns please have the patient contact our office.         Sincerely,      Sukumar Fox MD

## 2021-11-24 LAB
ANION GAP SERPL CALC-SCNC: 13 MMOL/L (ref 5–15)
BUN SERPL-MCNC: 16 MG/DL (ref 6–20)
BUN/CREAT SERPL: 19 (ref 12–20)
CALCIUM SERPL-MCNC: 9.4 MG/DL (ref 8.5–10.1)
CHLORIDE SERPL-SCNC: 105 MMOL/L (ref 97–108)
CO2 SERPL-SCNC: 26 MMOL/L (ref 21–32)
CREAT SERPL-MCNC: 0.86 MG/DL (ref 0.55–1.02)
GLUCOSE SERPL-MCNC: 196 MG/DL (ref 65–100)
POTASSIUM SERPL-SCNC: 3.8 MMOL/L (ref 3.5–5.1)
SODIUM SERPL-SCNC: 144 MMOL/L (ref 136–145)
TSH SERPL DL<=0.05 MIU/L-ACNC: 1.91 UIU/ML (ref 0.36–3.74)

## 2021-11-29 DIAGNOSIS — R73.9 ELEVATED BLOOD SUGAR: Primary | ICD-10-CM

## 2024-07-11 ENCOUNTER — HOSPITAL ENCOUNTER (EMERGENCY)
Facility: HOSPITAL | Age: 59
Discharge: HOME | End: 2024-07-11

## 2024-07-11 VITALS
SYSTOLIC BLOOD PRESSURE: 137 MMHG | HEART RATE: 76 BPM | TEMPERATURE: 98.2 F | RESPIRATION RATE: 20 BRPM | OXYGEN SATURATION: 100 % | DIASTOLIC BLOOD PRESSURE: 123 MMHG

## 2024-07-13 ENCOUNTER — HOSPITAL ENCOUNTER (EMERGENCY)
Facility: HOSPITAL | Age: 59
Discharge: HOME | End: 2024-07-13
Attending: STUDENT IN AN ORGANIZED HEALTH CARE EDUCATION/TRAINING PROGRAM
Payer: COMMERCIAL

## 2024-07-13 ENCOUNTER — APPOINTMENT (OUTPATIENT)
Facility: HOSPITAL | Age: 59
End: 2024-07-13
Payer: COMMERCIAL

## 2024-07-13 VITALS
WEIGHT: 154.76 LBS | BODY MASS INDEX: 24.98 KG/M2 | HEART RATE: 92 BPM | SYSTOLIC BLOOD PRESSURE: 158 MMHG | RESPIRATION RATE: 16 BRPM | TEMPERATURE: 98.5 F | DIASTOLIC BLOOD PRESSURE: 83 MMHG | OXYGEN SATURATION: 100 %

## 2024-07-13 DIAGNOSIS — M54.2 CERVICAL PAIN (NECK): Primary | ICD-10-CM

## 2024-07-13 PROCEDURE — 99284 EMERGENCY DEPT VISIT MOD MDM: CPT

## 2024-07-13 PROCEDURE — 72125 CT NECK SPINE W/O DYE: CPT

## 2024-07-13 PROCEDURE — 6360000002 HC RX W HCPCS: Performed by: EMERGENCY MEDICINE

## 2024-07-13 PROCEDURE — 6370000000 HC RX 637 (ALT 250 FOR IP): Performed by: EMERGENCY MEDICINE

## 2024-07-13 PROCEDURE — 96372 THER/PROPH/DIAG INJ SC/IM: CPT

## 2024-07-13 RX ORDER — DEXAMETHASONE SODIUM PHOSPHATE 10 MG/ML
10 INJECTION, SOLUTION INTRAMUSCULAR; INTRAVENOUS
Status: COMPLETED | OUTPATIENT
Start: 2024-07-13 | End: 2024-07-13

## 2024-07-13 RX ORDER — LIDOCAINE 4 G/G
1 PATCH TOPICAL
Status: DISCONTINUED | OUTPATIENT
Start: 2024-07-13 | End: 2024-07-13 | Stop reason: HOSPADM

## 2024-07-13 RX ORDER — PREDNISONE 20 MG/1
20 TABLET ORAL DAILY
Qty: 5 TABLET | Refills: 0 | Status: SHIPPED | OUTPATIENT
Start: 2024-07-13 | End: 2024-07-18

## 2024-07-13 RX ORDER — LIDOCAINE 50 MG/G
1 PATCH TOPICAL DAILY
Qty: 10 PATCH | Refills: 0 | Status: SHIPPED | OUTPATIENT
Start: 2024-07-13 | End: 2024-07-23

## 2024-07-13 RX ORDER — FAMOTIDINE 20 MG/1
20 TABLET, FILM COATED ORAL
Status: COMPLETED | OUTPATIENT
Start: 2024-07-13 | End: 2024-07-13

## 2024-07-13 RX ORDER — KETOROLAC TROMETHAMINE 30 MG/ML
30 INJECTION, SOLUTION INTRAMUSCULAR; INTRAVENOUS
Status: COMPLETED | OUTPATIENT
Start: 2024-07-13 | End: 2024-07-13

## 2024-07-13 RX ADMIN — FAMOTIDINE 20 MG: 20 TABLET ORAL at 08:20

## 2024-07-13 RX ADMIN — KETOROLAC TROMETHAMINE 30 MG: 30 INJECTION, SOLUTION INTRAMUSCULAR at 08:21

## 2024-07-13 RX ADMIN — DEXAMETHASONE SODIUM PHOSPHATE 10 MG: 10 INJECTION, SOLUTION INTRAMUSCULAR; INTRAVENOUS at 08:20

## 2024-07-13 ASSESSMENT — ENCOUNTER SYMPTOMS
NAUSEA: 0
TROUBLE SWALLOWING: 0
DIARRHEA: 0
SHORTNESS OF BREATH: 0
COUGH: 0
ABDOMINAL PAIN: 0

## 2024-07-13 ASSESSMENT — PAIN DESCRIPTION - ORIENTATION: ORIENTATION: RIGHT

## 2024-07-13 ASSESSMENT — PAIN SCALES - GENERAL: PAINLEVEL_OUTOF10: 10

## 2024-07-13 ASSESSMENT — PAIN DESCRIPTION - DESCRIPTORS: DESCRIPTORS: SPASM

## 2024-07-13 ASSESSMENT — PAIN - FUNCTIONAL ASSESSMENT
PAIN_FUNCTIONAL_ASSESSMENT: PREVENTS OR INTERFERES SOME ACTIVE ACTIVITIES AND ADLS
PAIN_FUNCTIONAL_ASSESSMENT: 0-10

## 2024-07-13 ASSESSMENT — PAIN DESCRIPTION - LOCATION: LOCATION: NECK

## 2024-07-13 NOTE — ED TRIAGE NOTES
Pt c/o back and neck spasms x 3 days, denies fever, thinks she turned wrong , seen at urgent care on Thursday and by a chiropractor

## 2024-07-13 NOTE — ED PROVIDER NOTES
Eyes:      Extraocular Movements: Extraocular movements intact.      Conjunctiva/sclera: Conjunctivae normal.      Pupils: Pupils are equal, round, and reactive to light.      Comments: No nystagmus   Neck:      Comments: Reports right-sided posterior lateral neck tenderness with active range of motion of the head neck and with palpation;Skin integrity is intact. There is no obvious bony or soft tissue deformity; no rash, bruising or extending erythema. Good neurovascular sensation. No apparent tendon or nerve injury.     Pulmonary:      Effort: Pulmonary effort is normal.      Breath sounds: Normal breath sounds.   Musculoskeletal:         General: Normal range of motion.      Cervical back: Tenderness present.   Skin:     General: Skin is warm and dry.      Findings: No bruising, erythema, lesion or rash.   Neurological:      Mental Status: She is alert and oriented to person, place, and time.         DIAGNOSTIC RESULTS     EKG: All EKG's are interpreted by the Emergency Department Physician who either signs or Co-signs this chart in the absence of a cardiologist.        RADIOLOGY:   Non-plain film images such as CT, Ultrasound and MRI are read by the radiologist. Plain radiographic images are visualized and preliminarily interpreted by the emergency physician with the below findings:        Interpretation per the Radiologist below, if available at the time of this note:    CT CERVICAL SPINE WO CONTRAST   Final Result   1. No acute abnormality         Electronically signed by Gelacio Tejada           LABS:  Labs Reviewed - No data to display    All other labs were within normal range or not returned as of this dictation.    EMERGENCY DEPARTMENT COURSE and DIFFERENTIAL DIAGNOSIS/MDM:   Vitals:    Vitals:    07/13/24 0801   BP: (!) 158/83   Pulse: 92   Resp: 16   Temp: 98.5 °F (36.9 °C)   TempSrc: Oral   SpO2: 100%   Weight: 70.2 kg (154 lb 12.2 oz)           Medical Decision Making  Amount and/or Complexity of

## 2024-08-06 ENCOUNTER — HOSPITAL ENCOUNTER (OUTPATIENT)
Facility: HOSPITAL | Age: 59
Discharge: HOME | End: 2024-08-09
Attending: NEUROLOGICAL SURGERY
Payer: COMMERCIAL

## 2024-08-06 DIAGNOSIS — M54.12 CERVICAL RADICULOPATHY: ICD-10-CM

## 2024-08-06 PROCEDURE — 72141 MRI NECK SPINE W/O DYE: CPT
